# Patient Record
Sex: MALE | Race: WHITE | Employment: UNEMPLOYED | URBAN - METROPOLITAN AREA
[De-identification: names, ages, dates, MRNs, and addresses within clinical notes are randomized per-mention and may not be internally consistent; named-entity substitution may affect disease eponyms.]

---

## 2024-08-12 ENCOUNTER — APPOINTMENT (EMERGENCY)
Dept: RADIOLOGY | Facility: HOSPITAL | Age: 64
End: 2024-08-12

## 2024-08-12 ENCOUNTER — HOSPITAL ENCOUNTER (EMERGENCY)
Facility: HOSPITAL | Age: 64
Discharge: HOME | End: 2024-08-12
Attending: EMERGENCY MEDICINE

## 2024-08-12 VITALS
SYSTOLIC BLOOD PRESSURE: 147 MMHG | DIASTOLIC BLOOD PRESSURE: 74 MMHG | HEART RATE: 64 BPM | OXYGEN SATURATION: 97 % | WEIGHT: 138.89 LBS | RESPIRATION RATE: 17 BRPM | TEMPERATURE: 97.5 F

## 2024-08-12 DIAGNOSIS — R06.2 WHEEZING: ICD-10-CM

## 2024-08-12 DIAGNOSIS — J40 BRONCHITIS: Primary | ICD-10-CM

## 2024-08-12 LAB
ALBUMIN SERPL BCG-MCNC: 4.7 G/DL (ref 3.5–5)
ALP SERPL-CCNC: 45 U/L (ref 34–104)
ALT SERPL W P-5'-P-CCNC: 13 U/L (ref 7–52)
ANION GAP SERPL CALCULATED.3IONS-SCNC: 6 MMOL/L (ref 4–13)
AST SERPL W P-5'-P-CCNC: 14 U/L (ref 13–39)
ATRIAL RATE: 63 BPM
ATRIAL RATE: 67 BPM
BASOPHILS # BLD AUTO: 0.05 THOUSANDS/ÂΜL (ref 0–0.1)
BASOPHILS NFR BLD AUTO: 1 % (ref 0–1)
BILIRUB SERPL-MCNC: 0.7 MG/DL (ref 0.2–1)
BUN SERPL-MCNC: 15 MG/DL (ref 5–25)
CALCIUM SERPL-MCNC: 10 MG/DL (ref 8.4–10.2)
CARDIAC TROPONIN I PNL SERPL HS: <2 NG/L
CARDIAC TROPONIN I PNL SERPL HS: <2 NG/L
CHLORIDE SERPL-SCNC: 104 MMOL/L (ref 96–108)
CO2 SERPL-SCNC: 29 MMOL/L (ref 21–32)
CREAT SERPL-MCNC: 1.01 MG/DL (ref 0.6–1.3)
EOSINOPHIL # BLD AUTO: 0.51 THOUSAND/ÂΜL (ref 0–0.61)
EOSINOPHIL NFR BLD AUTO: 6 % (ref 0–6)
ERYTHROCYTE [DISTWIDTH] IN BLOOD BY AUTOMATED COUNT: 12 % (ref 11.6–15.1)
FLUAV RNA RESP QL NAA+PROBE: NEGATIVE
FLUBV RNA RESP QL NAA+PROBE: NEGATIVE
GFR SERPL CREATININE-BSD FRML MDRD: 79 ML/MIN/1.73SQ M
GLUCOSE SERPL-MCNC: 107 MG/DL (ref 65–140)
HCT VFR BLD AUTO: 45.4 % (ref 36.5–49.3)
HGB BLD-MCNC: 15.1 G/DL (ref 12–17)
IMM GRANULOCYTES # BLD AUTO: 0.02 THOUSAND/UL (ref 0–0.2)
IMM GRANULOCYTES NFR BLD AUTO: 0 % (ref 0–2)
LYMPHOCYTES # BLD AUTO: 2.13 THOUSANDS/ÂΜL (ref 0.6–4.47)
LYMPHOCYTES NFR BLD AUTO: 24 % (ref 14–44)
MCH RBC QN AUTO: 31.8 PG (ref 26.8–34.3)
MCHC RBC AUTO-ENTMCNC: 33.3 G/DL (ref 31.4–37.4)
MCV RBC AUTO: 96 FL (ref 82–98)
MONOCYTES # BLD AUTO: 0.51 THOUSAND/ÂΜL (ref 0.17–1.22)
MONOCYTES NFR BLD AUTO: 6 % (ref 4–12)
NEUTROPHILS # BLD AUTO: 5.59 THOUSANDS/ÂΜL (ref 1.85–7.62)
NEUTS SEG NFR BLD AUTO: 63 % (ref 43–75)
NRBC BLD AUTO-RTO: 0 /100 WBCS
P AXIS: 72 DEGREES
P AXIS: 82 DEGREES
PLATELET # BLD AUTO: 203 THOUSANDS/UL (ref 149–390)
PMV BLD AUTO: 9.9 FL (ref 8.9–12.7)
POTASSIUM SERPL-SCNC: 4.1 MMOL/L (ref 3.5–5.3)
PR INTERVAL: 132 MS
PR INTERVAL: 146 MS
PROT SERPL-MCNC: 7.6 G/DL (ref 6.4–8.4)
QRS AXIS: 52 DEGREES
QRS AXIS: 53 DEGREES
QRSD INTERVAL: 90 MS
QRSD INTERVAL: 96 MS
QT INTERVAL: 408 MS
QT INTERVAL: 430 MS
QTC INTERVAL: 431 MS
QTC INTERVAL: 440 MS
RBC # BLD AUTO: 4.75 MILLION/UL (ref 3.88–5.62)
RSV RNA RESP QL NAA+PROBE: NEGATIVE
SARS-COV-2 RNA RESP QL NAA+PROBE: NEGATIVE
SODIUM SERPL-SCNC: 139 MMOL/L (ref 135–147)
T WAVE AXIS: 53 DEGREES
T WAVE AXIS: 58 DEGREES
VENTRICULAR RATE: 63 BPM
VENTRICULAR RATE: 67 BPM
WBC # BLD AUTO: 8.81 THOUSAND/UL (ref 4.31–10.16)

## 2024-08-12 PROCEDURE — 80053 COMPREHEN METABOLIC PANEL: CPT

## 2024-08-12 PROCEDURE — 93005 ELECTROCARDIOGRAM TRACING: CPT

## 2024-08-12 PROCEDURE — 71046 X-RAY EXAM CHEST 2 VIEWS: CPT

## 2024-08-12 PROCEDURE — 94640 AIRWAY INHALATION TREATMENT: CPT

## 2024-08-12 PROCEDURE — 36415 COLL VENOUS BLD VENIPUNCTURE: CPT

## 2024-08-12 PROCEDURE — 84484 ASSAY OF TROPONIN QUANT: CPT

## 2024-08-12 PROCEDURE — 96374 THER/PROPH/DIAG INJ IV PUSH: CPT

## 2024-08-12 PROCEDURE — 99283 EMERGENCY DEPT VISIT LOW MDM: CPT

## 2024-08-12 PROCEDURE — 85025 COMPLETE CBC W/AUTO DIFF WBC: CPT

## 2024-08-12 PROCEDURE — 99285 EMERGENCY DEPT VISIT HI MDM: CPT

## 2024-08-12 PROCEDURE — 93010 ELECTROCARDIOGRAM REPORT: CPT | Performed by: INTERNAL MEDICINE

## 2024-08-12 PROCEDURE — 0241U HB NFCT DS VIR RESP RNA 4 TRGT: CPT

## 2024-08-12 RX ORDER — METHYLPREDNISOLONE SODIUM SUCCINATE 125 MG/2ML
125 INJECTION, POWDER, LYOPHILIZED, FOR SOLUTION INTRAMUSCULAR; INTRAVENOUS ONCE
Status: COMPLETED | OUTPATIENT
Start: 2024-08-12 | End: 2024-08-12

## 2024-08-12 RX ORDER — GUAIFENESIN 600 MG/1
1200 TABLET, EXTENDED RELEASE ORAL 2 TIMES DAILY
Qty: 28 TABLET | Refills: 0 | Status: SHIPPED | OUTPATIENT
Start: 2024-08-12

## 2024-08-12 RX ORDER — GUAIFENESIN 600 MG/1
600 TABLET, EXTENDED RELEASE ORAL ONCE
Status: COMPLETED | OUTPATIENT
Start: 2024-08-12 | End: 2024-08-12

## 2024-08-12 RX ORDER — BENZONATATE 100 MG/1
100 CAPSULE ORAL ONCE
Status: COMPLETED | OUTPATIENT
Start: 2024-08-12 | End: 2024-08-12

## 2024-08-12 RX ORDER — ALBUTEROL SULFATE 90 UG/1
2 AEROSOL, METERED RESPIRATORY (INHALATION) EVERY 6 HOURS PRN
Qty: 6.7 G | Refills: 0 | Status: SHIPPED | OUTPATIENT
Start: 2024-08-12 | End: 2024-08-12

## 2024-08-12 RX ORDER — BENZONATATE 100 MG/1
100 CAPSULE ORAL EVERY 8 HOURS
Qty: 21 CAPSULE | Refills: 0 | Status: SHIPPED | OUTPATIENT
Start: 2024-08-12

## 2024-08-12 RX ORDER — ALBUTEROL SULFATE 90 UG/1
2 AEROSOL, METERED RESPIRATORY (INHALATION) EVERY 6 HOURS PRN
Qty: 6.7 G | Refills: 0 | Status: SHIPPED | OUTPATIENT
Start: 2024-08-12 | End: 2024-08-26

## 2024-08-12 RX ORDER — IPRATROPIUM BROMIDE AND ALBUTEROL SULFATE 2.5; .5 MG/3ML; MG/3ML
3 SOLUTION RESPIRATORY (INHALATION) ONCE
Status: COMPLETED | OUTPATIENT
Start: 2024-08-12 | End: 2024-08-12

## 2024-08-12 RX ORDER — PREDNISONE 20 MG/1
40 TABLET ORAL DAILY
Qty: 8 TABLET | Refills: 0 | Status: SHIPPED | OUTPATIENT
Start: 2024-08-12 | End: 2024-08-16

## 2024-08-12 RX ORDER — BENZONATATE 100 MG/1
100 CAPSULE ORAL EVERY 8 HOURS
Qty: 21 CAPSULE | Refills: 0 | Status: SHIPPED | OUTPATIENT
Start: 2024-08-12 | End: 2024-08-12

## 2024-08-12 RX ORDER — GUAIFENESIN 600 MG/1
1200 TABLET, EXTENDED RELEASE ORAL 2 TIMES DAILY
Qty: 28 TABLET | Refills: 0 | Status: SHIPPED | OUTPATIENT
Start: 2024-08-12 | End: 2024-08-12

## 2024-08-12 RX ORDER — ALBUTEROL SULFATE 90 UG/1
2 AEROSOL, METERED RESPIRATORY (INHALATION) ONCE
Status: COMPLETED | OUTPATIENT
Start: 2024-08-12 | End: 2024-08-12

## 2024-08-12 RX ORDER — PREDNISONE 20 MG/1
40 TABLET ORAL DAILY
Qty: 8 TABLET | Refills: 0 | Status: SHIPPED | OUTPATIENT
Start: 2024-08-12 | End: 2024-08-12

## 2024-08-12 RX ADMIN — BENZONATATE 100 MG: 100 CAPSULE ORAL at 05:06

## 2024-08-12 RX ADMIN — ALBUTEROL SULFATE 2 PUFF: 90 AEROSOL, METERED RESPIRATORY (INHALATION) at 06:10

## 2024-08-12 RX ADMIN — METHYLPREDNISOLONE SODIUM SUCCINATE 125 MG: 125 INJECTION, POWDER, FOR SOLUTION INTRAMUSCULAR; INTRAVENOUS at 05:06

## 2024-08-12 RX ADMIN — IPRATROPIUM BROMIDE AND ALBUTEROL SULFATE 3 ML: 2.5; .5 SOLUTION RESPIRATORY (INHALATION) at 05:06

## 2024-08-12 RX ADMIN — GUAIFENESIN 600 MG: 600 TABLET, EXTENDED RELEASE ORAL at 06:10

## 2024-08-12 NOTE — ED PROVIDER NOTES
History  Chief Complaint   Patient presents with    Cough     Pt reports persistent productive cough, had a coughing attack tonight and was unable to catch breath.      The patient is a 63-year-old male with history of tobacco abuse who presents to the ED for evaluation of cough that has been present for several weeks.  Over the last day, he developed a pressure in his chest, as well as worsening coughing.  He reports he had several episodes of coughing fits where he could not catch his breath, prompting ED evaluation.  He otherwise denies fever, chills, nausea, vomiting, abdominal pain, back pain, sick contacts,  leg swelling, calf pain, recent travel, recent surgery, hemoptysis, history of DVT/PE.  He took NyQuil without relief.        None       History reviewed. No pertinent past medical history.    History reviewed. No pertinent surgical history.    History reviewed. No pertinent family history.  I have reviewed and agree with the history as documented.    E-Cigarette/Vaping     E-Cigarette/Vaping Substances     Social History     Tobacco Use    Smoking status: Every Day     Types: Cigarettes    Smokeless tobacco: Never   Substance Use Topics    Alcohol use: Yes       Review of Systems   Constitutional:  Negative for chills and fever.   Respiratory:  Positive for cough and shortness of breath (with coughing).    Cardiovascular:  Positive for chest pain. Negative for leg swelling.   Gastrointestinal:  Negative for abdominal pain, constipation, diarrhea, nausea and vomiting.   Genitourinary:  Negative for dysuria and flank pain.   Musculoskeletal:  Negative for arthralgias and myalgias.   Skin:  Negative for rash and wound.   Neurological:  Negative for dizziness, weakness, numbness and headaches.       Physical Exam  Physical Exam  Vitals and nursing note reviewed.   Constitutional:       General: He is not in acute distress.     Appearance: He is well-developed. He is not toxic-appearing.   HENT:      Head:  Normocephalic and atraumatic.   Eyes:      Conjunctiva/sclera: Conjunctivae normal.   Cardiovascular:      Rate and Rhythm: Normal rate and regular rhythm.      Heart sounds: No murmur heard.  Pulmonary:      Effort: Pulmonary effort is normal. No respiratory distress.      Breath sounds: Wheezing present.   Abdominal:      Palpations: Abdomen is soft.      Tenderness: There is no abdominal tenderness. There is no guarding or rebound.   Musculoskeletal:         General: No swelling.      Cervical back: Neck supple.      Right lower leg: No edema.      Left lower leg: No edema.   Skin:     General: Skin is warm and dry.      Capillary Refill: Capillary refill takes less than 2 seconds.   Neurological:      Mental Status: He is alert.   Psychiatric:         Mood and Affect: Mood normal.         Vital Signs  ED Triage Vitals   Temperature Pulse Respirations Blood Pressure SpO2   08/12/24 0446 08/12/24 0446 08/12/24 0446 08/12/24 0446 08/12/24 0446   97.5 °F (36.4 °C) 82 18 143/95 98 %      Temp Source Heart Rate Source Patient Position - Orthostatic VS BP Location FiO2 (%)   08/12/24 0446 08/12/24 0446 08/12/24 0446 08/12/24 0446 --   Oral Monitor Sitting Right arm       Pain Score       08/12/24 0637       No Pain           Vitals:    08/12/24 0446 08/12/24 0637 08/12/24 0708   BP: 143/95 160/79 147/74   Pulse: 82 76 64   Patient Position - Orthostatic VS: Sitting Lying Lying         Visual Acuity      ED Medications  Medications   methylPREDNISolone sodium succinate (Solu-MEDROL) injection 125 mg (125 mg Intravenous Given 8/12/24 0506)   ipratropium-albuterol (DUO-NEB) 0.5-2.5 mg/3 mL inhalation solution 3 mL (3 mL Nebulization Given 8/12/24 0506)   benzonatate (TESSALON PERLES) capsule 100 mg (100 mg Oral Given 8/12/24 0506)   albuterol (PROVENTIL HFA,VENTOLIN HFA) inhaler 2 puff (2 puffs Inhalation Given 8/12/24 0610)   guaiFENesin (MUCINEX) 12 hr tablet 600 mg (600 mg Oral Given 8/12/24 0610)       Diagnostic  Studies  Results Reviewed       Procedure Component Value Units Date/Time    HS Troponin I 2hr [[id number]] Collected: 08/12/24 0704    Lab Status: Final result Specimen: Blood from Arm, Left Updated: 08/12/24 0738     hs TnI 2hr <2 ng/L      Delta 2hr hsTnI --    FLU/RSV/COVID - if FLU/RSV clinically relevant [[id number]]  (Normal) Collected: 08/12/24 0506    Lab Status: Final result Specimen: Nares from Nose Updated: 08/12/24 0559     SARS-CoV-2 Negative     INFLUENZA A PCR Negative     INFLUENZA B PCR Negative     RSV PCR Negative    Narrative:      FOR PEDIATRIC PATIENTS - copy/paste COVID Guidelines URL to browser: [email or web address]    SARS-CoV-2 assay is a Nucleic Acid Amplification assay intended for the  qualitative detection of nucleic acid from SARS-CoV-2 in nasopharyngeal  swabs. Results are for the presumptive identification of SARS-CoV-2 RNA.    Positive results are indicative of infection with SARS-CoV-2, the virus  causing COVID-19, but do not rule out bacterial infection or co-infection  with other viruses. Laboratories within the [place] and its  territories are required to report all positive results to the appropriate  public health authorities. Negative results do not preclude SARS-CoV-2  infection and should not be used as the sole basis for treatment or other  patient management decisions. Negative results must be combined with  clinical observations, patient history, and epidemiological information.  This test has not been FDA cleared or approved.    This test has been authorized by FDA under an Emergency Use Authorization  (EUA). This test is only authorized for the duration of time the  declaration that circumstances exist justifying the authorization of the  emergency use of an in vitro diagnostic tests for detection of SARS-CoV-2  virus and/or diagnosis of COVID-19 infection under section 564(b)(1) of  the Act, 21 U.S.C.  360bbb-3(b)(1), unless the authorization is terminated  or revoked sooner. The test has been validated but independent review by FDA  and CLIA is pending.    Test performed using [facility] [employer]: This RT-PCR assay targets N2,  a region unique to SARS-CoV-2. A conserved region in the E-gene was chosen  for pan-Sarbecovirus detection which includes SARS-CoV-2.    According to CMS-2020-01-R, this platform meets the definition of high-throughput technology.    HS Troponin 0hr (reflex protocol) [[id number]]  (Normal) Collected: 08/12/24 0506    Lab Status: Final result Specimen: Blood from Arm, Left Updated: 08/12/24 0542     hs TnI 0hr <2 ng/L     Comprehensive metabolic panel [[id number]] Collected: 08/12/24 0506    Lab Status: Final result Specimen: Blood from Arm, Left Updated: 08/12/24 0538     Sodium 139 mmol/L      Potassium 4.1 mmol/L      Chloride 104 mmol/L      CO2 29 mmol/L      ANION GAP 6 mmol/L      BUN 15 mg/dL      Creatinine 1.01 mg/dL      Glucose 107 mg/dL      Calcium 10.0 mg/dL      AST 14 U/L      ALT 13 U/L      Alkaline Phosphatase 45 U/L      Total Protein 7.6 g/dL      Albumin 4.7 g/dL      Total Bilirubin 0.70 mg/dL      eGFR 79 ml/min/1.73sq m     Narrative:      National Kidney Disease Foundation guidelines for Chronic Kidney Disease (CKD):     Stage 1 with normal or high GFR (GFR > 90 mL/min/1.73 square meters)    Stage 2 Mild CKD (GFR = 60-89 mL/min/1.73 square meters)    Stage 3A Moderate CKD (GFR = 45-59 mL/min/1.73 square meters)    Stage 3B Moderate CKD (GFR = 30-44 mL/min/1.73 square meters)    Stage 4 Severe CKD (GFR = 15-29 mL/min/1.73 square meters)    Stage 5 End Stage CKD (GFR <15 mL/min/1.73 square meters)  Note: GFR calculation is accurate only with a steady state creatinine    CBC and differential [[id number]] Collected: 08/12/24 0506    Lab Status: Final result Specimen: Blood from Arm, Left Updated: 08/12/24 0515     WBC 8.81 Thousand/uL      RBC 4.75 Million/uL       Hemoglobin 15.1 g/dL      Hematocrit 45.4 %      MCV 96 fL      MCH 31.8 pg      MCHC 33.3 g/dL      RDW 12.0 %      MPV 9.9 fL      Platelets 203 Thousands/uL      nRBC 0 /100 WBCs      Segmented % 63 %      Immature Grans % 0 %      Lymphocytes % 24 %      Monocytes % 6 %      Eosinophils Relative 6 %      Basophils Relative 1 %      Absolute Neutrophils 5.59 Thousands/µL      Absolute Immature Grans 0.02 Thousand/uL      Absolute Lymphocytes 2.13 Thousands/µL      Absolute Monocytes 0.51 Thousand/µL      Eosinophils Absolute 0.51 Thousand/µL      Basophils Absolute 0.05 Thousands/µL                    XR chest 2 views   ED Interpretation by Trang Turcios PA-C (08/12 0553)   No evidence of infiltrate, pneumothorax, or acute cardiopulmonary disease as interpreted by me      Final Result by Ginny Morales MD (08/12 0602)      No acute cardiopulmonary disease.               Workstation performed: [id number]                    Procedures  Procedures         ED Course  ED Course as of 08/12/24 2334   Mon Aug 12, 2024   0518 WBC: 8.81   0518 EKG normal sinus rhythm at 67 bpm.  , QTc 431, normal axis, T wave inversion in V5, no ST elevation or depression as interpreted by me, no previous EKG for comparison   0543 hs TnI 0hr: <2  Patient's chest pain started 1-2 hours prior to arrival; will obtain repeat          Medical Decision Making  DDx including but not limited to:  URI, bronchitis, pneumonia, GERD, aspiration pneumonitis, viral illness, COVID 19, ACS, MI    Will obtain EKG, troponin to evaluate for ACS.  Will obtain chest x-ray to evaluate for cardiopulmonary abnormality including pneumonia, pneumothorax.  Will obtain CBC to evaluate for leukocytosis, anemia.  Will obtain CMP to evaluate kidney function, for electrolyte disturbance.   Will obtain COVID/flu/RSV testing.      Labs unremarkable. CXR without evidence of PNA, PNX. EKG without ischemic changes. Given CP started 1-2 hours ago, will  obtain delta. Care turned over to Willa Hernandez PA-C pending delta trop.          Problems Addressed:  Bronchitis: acute illness or injury  Wheezing: acute illness or injury    Amount and/or Complexity of Data Reviewed  Labs: ordered. Decision-making details documented in ED Course.  Radiology: ordered and independent interpretation performed. Decision-making details documented in ED Course.  ECG/medicine tests: ordered and independent interpretation performed. Decision-making details documented in ED Course.    Risk  OTC drugs.  Prescription drug management.                 Disposition  Final diagnoses:   Bronchitis   Wheezing     Time reflects when diagnosis was documented in both MDM as applicable and the Disposition within this note       Time User Action Codes Description Comment    8/12/2024  5:50 AM Trang Turcios [J40] Bronchitis     8/12/2024  5:51 AM Trang Turcios [R06.2] Wheezing           ED Disposition       ED Disposition   Discharge    Condition   Stable    Date/Time   Mon Aug 12, 2024  8:04 AM    Comment   Sesar Earl discharge to home/self care.                   Follow-up Information       Follow up With Specialties Details Why Contact Info Additional Information    [facility] Family Medicine Schedule an appointment as soon as possible for a visit   [address] [place] [address] [phone] [facility], [address] [place] [address], [address] [phone]            Discharge Medication List as of 8/12/2024  8:04 AM        CONTINUE these medications which have CHANGED    Details   albuterol (PROVENTIL HFA,VENTOLIN HFA) 90 mcg/act inhaler Inhale 2 puffs every 6 (six) hours as needed for wheezing or shortness of breath for up to 14 days, Starting Mon 8/12/2024, Until Mon 8/26/2024 at 2359, Normal      benzonatate (TESSALON PERLES) 100 mg capsule Take 1 capsule  (100 mg total) by mouth every 8 (eight) hours, Starting Mon 8/12/2024, Normal      guaiFENesin (MUCINEX) 600 mg 12 hr tablet Take 2 tablets (1,200 mg total) by mouth 2 (two) times a day, Starting Mon 8/12/2024, Normal      predniSONE 20 mg tablet Take 2 tablets (40 mg total) by mouth daily for 4 days, Starting Mon 8/12/2024, Until Fri 8/16/2024, Normal                 PDMP Review       None            ED Provider  Electronically Signed by             Trang Turcios PA-C  08/12/24 0735

## 2024-08-12 NOTE — ED CARE HANDOFF
Emergency Department Sign Out Note        Sign out and transfer of care from Trang Turcios PA-C. See Separate Emergency Department note.     The patient, Sesar Earl, was evaluated by the previous provider for cough for several weeks.    Per previous provider note - The patient is a 63-year-old male with history of tobacco abuse who presents to the ED for evaluation of cough that has been present for several weeks. Over the last day, he developed a pressure in his chest, as well as worsening coughing. He reports he had several episodes of coughing fits where he could not catch his breath, prompting ED evaluation. He otherwise denies fever, chills, nausea, vomiting, abdominal pain, back pain, sick contacts, leg swelling, calf pain, recent travel, recent surgery, hemoptysis, history of DVT/PE. He took NyQuil without relief.     Workup Completed:  EKG  CXR  CBC  CMP  Covid/flu/RSV  Troponin    ED Course / Workup Pending (followup):  0610 - Per sign out, waiting for delta troponin.    0745 - hs Tnl 2hr: <2    0800 - Informed patient of lab finding. Will discharge. Patient agreeable.    The management plan was discussed in detail with the patient at bedside and all questions were answered.  Prior to discharge, we provided both verbal and written instructions.  We discussed with the patient the signs and symptoms for which to return to the emergency department.  All questions were answered and patient was comfortable with the plan of care and discharged to home.  Instructed the patient to follow up with the primary care provider and/or specialist provided and their written instructions.  The patient verbalized understanding of our discussion and plan of care, and agrees to return to the Emergency Department for concerns and progression of illness.    At discharge, I instructed the patient to:  -follow up with pcp  -use Albuterol inhaler as prescribed  -take Tessalon, Guaifenesin and Prednisone as prescribed  -return  to the ER if symptoms worsened or new symptoms arose  Patient agreed to this plan and was stable at time of discharge.                                   ED Course as of 08/12/24 0832   Mon Aug 12, 2024   0745 hs TnI 2hr: <2     ECG 12 Lead Documentation Only    Date/Time: 8/12/2024 7:02 AM    Performed by: Willa Hernandez PA-C  Authorized by: Willa Hernandez PA-C    Indications / Diagnosis:  Delta  ECG reviewed by me, the ED Provider: yes (Also reviewed by attending)    Patient location:  ED  Interpretation:     Interpretation: abnormal    Rate:     ECG rate:  63    ECG rate assessment: normal    Rhythm:     Rhythm: sinus rhythm    Ectopy:     Ectopy: none    QRS:     QRS intervals:  Normal (96ms)  ST segments:     ST segments:  Normal  T waves:     T waves: inverted      Inverted:  AVR and V5    Medical Decision Making  Problems Addressed:  Bronchitis: acute illness or injury  Wheezing: acute illness or injury    Amount and/or Complexity of Data Reviewed  Independent Historian:      Details: Patient is historian  Labs: ordered. Decision-making details documented in ED Course.  Radiology: ordered and independent interpretation performed.  ECG/medicine tests: ordered and independent interpretation performed.    Risk  OTC drugs.  Prescription drug management.            Disposition  Final diagnoses:   Bronchitis   Wheezing     Time reflects when diagnosis was documented in both MDM as applicable and the Disposition within this note       Time User Action Codes Description Comment    8/12/2024  5:50 AM Trang Turcios Add [J40] Bronchitis     8/12/2024  5:51 AM Trang Turcios [R06.2] Wheezing           ED Disposition       ED Disposition   Discharge    Condition   Stable    Date/Time   Mon Aug 12, 2024  8:04 AM    Comment   Sesar Earl discharge to home/self care.                   Follow-up Information       Follow up With Specialties Details Why Contact Info Additional Information    [facility] Family Medicine Schedule an appointment as soon as possible for a visit   [address] [place] [address], [address] [address] [phone] [facility], [address] [place] [address], [place] [address], [place], [place], [address] [phone]          Discharge Medication List as of 8/12/2024  8:04 AM        CONTINUE these medications which have CHANGED    Details   albuterol (PROVENTIL HFA,VENTOLIN HFA) 90 mcg/act inhaler Inhale 2 puffs every 6 (six) hours as needed for wheezing or shortness of breath for up to 14 days, Starting Mon 8/12/2024, Until Mon 8/26/2024 at 2359, Normal      benzonatate (TESSALON PERLES) 100 mg capsule Take 1 capsule (100 mg total) by mouth every 8 (eight) hours, Starting Mon 8/12/2024, Normal      guaiFENesin (MUCINEX) 600 mg 12 hr tablet Take 2 tablets (1,200 mg total) by mouth 2 (two) times a day, Starting Mon 8/12/2024, Normal      predniSONE 20 mg tablet Take 2 tablets (40 mg total) by mouth daily for 4 days, Starting Mon 8/12/2024, Until Fri 8/16/2024, Normal                  ED Provider  Electronically Signed by     Willa Hernandez PA-C  08/12/24 1033

## 2024-08-12 NOTE — DISCHARGE INSTRUCTIONS
Use Albuterol inhaler as prescribed, as needed for coughing fit, shortness of breath, and or wheezing. If no improvement, return to the ER    Take Prednisone (steroid) for the next 4 days. This will decreased inflammation in your lungs    Take Tessalon and Mucinex as prescribed, as needed for cough. Do not combine with over the counter medications that contain guaifenesin (Mucinex)    Return for chest pain, trouble breathing, leg swelling, coughing up blood, passing out, or any other new/concerning symptoms     Follow up with Family Medicine      Weekly routine line dressing change completed. Patient's right SC VasCath dressing changed using sterile technique per protocol. Patient tolerated well. Please consult IV/PICC Team for any questions or if patient's needs change.

## 2025-05-11 ENCOUNTER — APPOINTMENT (INPATIENT)
Dept: NON INVASIVE DIAGNOSTICS | Facility: HOSPITAL | Age: 65
DRG: 174 | End: 2025-05-11
Payer: COMMERCIAL

## 2025-05-11 ENCOUNTER — HOSPITAL ENCOUNTER (INPATIENT)
Facility: HOSPITAL | Age: 65
LOS: 2 days | Discharge: HOME | DRG: 174 | End: 2025-05-13
Attending: EMERGENCY MEDICINE | Admitting: INTERNAL MEDICINE
Payer: COMMERCIAL

## 2025-05-11 ENCOUNTER — APPOINTMENT (EMERGENCY)
Dept: RADIOLOGY | Facility: HOSPITAL | Age: 65
DRG: 174 | End: 2025-05-11
Payer: COMMERCIAL

## 2025-05-11 DIAGNOSIS — I21.3 STEMI (ST ELEVATION MYOCARDIAL INFARCTION) (HCC): Primary | ICD-10-CM

## 2025-05-11 DIAGNOSIS — R00.1 BRADYCARDIA: ICD-10-CM

## 2025-05-11 PROBLEM — Z87.891 SMOKING HX: Status: ACTIVE | Noted: 2025-05-11

## 2025-05-11 PROBLEM — E78.5 HYPERLIPIDEMIA: Status: ACTIVE | Noted: 2025-05-11

## 2025-05-11 LAB
2HR DELTA HS TROPONIN: ABNORMAL NG/L
4HR DELTA HS TROPONIN: ABNORMAL NG/L
ANION GAP SERPL CALCULATED.3IONS-SCNC: 11 MMOL/L (ref 4–13)
AORTIC ROOT: 3.4 CM
APTT PPP: 24 SECONDS (ref 23–34)
ASCENDING AORTA: 3.5 CM
BASOPHILS # BLD AUTO: 0.09 THOUSANDS/ÂΜL (ref 0–0.1)
BASOPHILS NFR BLD AUTO: 1 % (ref 0–1)
BSA FOR ECHO PROCEDURE: 1.8 M2
BUN SERPL-MCNC: 16 MG/DL (ref 5–25)
CALCIUM SERPL-MCNC: 9.5 MG/DL (ref 8.4–10.2)
CARDIAC TROPONIN I PNL SERPL HS: 42 NG/L (ref ?–50)
CARDIAC TROPONIN I PNL SERPL HS: ABNORMAL NG/L (ref ?–50)
CARDIAC TROPONIN I PNL SERPL HS: ABNORMAL NG/L (ref ?–50)
CHLORIDE SERPL-SCNC: 102 MMOL/L (ref 96–108)
CHOLEST SERPL-MCNC: 202 MG/DL (ref ?–200)
CO2 SERPL-SCNC: 30 MMOL/L (ref 21–32)
CREAT SERPL-MCNC: 0.92 MG/DL (ref 0.6–1.3)
E WAVE DECELERATION TIME: 141 MS
E/A RATIO: 1.08
EOSINOPHIL # BLD AUTO: 0.8 THOUSAND/ÂΜL (ref 0–0.61)
EOSINOPHIL NFR BLD AUTO: 7 % (ref 0–6)
ERYTHROCYTE [DISTWIDTH] IN BLOOD BY AUTOMATED COUNT: 12.1 % (ref 11.6–15.1)
EST. AVERAGE GLUCOSE BLD GHB EST-MCNC: 120 MG/DL
FRACTIONAL SHORTENING: 29 (ref 28–44)
GFR SERPL CREATININE-BSD FRML MDRD: 88 ML/MIN/1.73SQ M
GLUCOSE SERPL-MCNC: 127 MG/DL (ref 65–140)
HBA1C MFR BLD: 5.8 %
HCT VFR BLD AUTO: 44.9 % (ref 36.5–49.3)
HDLC SERPL-MCNC: 49 MG/DL
HGB BLD-MCNC: 14.5 G/DL (ref 12–17)
IMM GRANULOCYTES # BLD AUTO: 0.03 THOUSAND/UL (ref 0–0.2)
IMM GRANULOCYTES NFR BLD AUTO: 0 % (ref 0–2)
INR PPP: 0.83 (ref 0.85–1.19)
INTERVENTRICULAR SEPTUM IN DIASTOLE (PARASTERNAL SHORT AXIS VIEW): 1.5 CM
INTERVENTRICULAR SEPTUM: 1.5 CM (ref 0.6–1.1)
LAAS-AP2: 17.8 CM2
LAAS-AP4: 20.4 CM2
LDLC SERPL CALC-MCNC: 128 MG/DL (ref 0–100)
LEFT ATRIUM SIZE: 3.9 CM
LEFT ATRIUM VOLUME (MOD BIPLANE): 56 ML
LEFT ATRIUM VOLUME INDEX (MOD BIPLANE): 31.1 ML/M2
LEFT INTERNAL DIMENSION IN SYSTOLE: 3.2 CM (ref 2.1–4)
LEFT VENTRICULAR INTERNAL DIMENSION IN DIASTOLE: 4.5 CM (ref 3.5–6)
LEFT VENTRICULAR POSTERIOR WALL IN END DIASTOLE: 1.2 CM
LEFT VENTRICULAR STROKE VOLUME: 52 ML
LV EF US.2D.A4C+ESTIMATED: 60 %
LVSV (TEICH): 52 ML
LYMPHOCYTES # BLD AUTO: 4.39 THOUSANDS/ÂΜL (ref 0.6–4.47)
LYMPHOCYTES NFR BLD AUTO: 39 % (ref 14–44)
MAGNESIUM SERPL-MCNC: 2.1 MG/DL (ref 1.9–2.7)
MCH RBC QN AUTO: 31.5 PG (ref 26.8–34.3)
MCHC RBC AUTO-ENTMCNC: 32.3 G/DL (ref 31.4–37.4)
MCV RBC AUTO: 97 FL (ref 82–98)
MONOCYTES # BLD AUTO: 0.79 THOUSAND/ÂΜL (ref 0.17–1.22)
MONOCYTES NFR BLD AUTO: 7 % (ref 4–12)
MV E'TISSUE VEL-LAT: 14 CM/S
MV E'TISSUE VEL-SEP: 9 CM/S
MV PEAK A VEL: 0.85 M/S
MV PEAK E VEL: 92 CM/S
MV STENOSIS PRESSURE HALF TIME: 41 MS
MV VALVE AREA P 1/2 METHOD: 5.4
NEUTROPHILS # BLD AUTO: 5.19 THOUSANDS/ÂΜL (ref 1.85–7.62)
NEUTS SEG NFR BLD AUTO: 46 % (ref 43–75)
NONHDLC SERPL-MCNC: 153 MG/DL
NRBC BLD AUTO-RTO: 0 /100 WBCS
PLATELET # BLD AUTO: 175 THOUSANDS/UL (ref 149–390)
PLATELET # BLD AUTO: 226 THOUSANDS/UL (ref 149–390)
PMV BLD AUTO: 10.3 FL (ref 8.9–12.7)
PMV BLD AUTO: 10.3 FL (ref 8.9–12.7)
POTASSIUM SERPL-SCNC: 3.8 MMOL/L (ref 3.5–5.3)
PROTHROMBIN TIME: 11.8 SECONDS (ref 12.3–15)
RBC # BLD AUTO: 4.61 MILLION/UL (ref 3.88–5.62)
RIGHT ATRIAL 2D VOLUME: 31 ML
RIGHT ATRIUM AREA SYSTOLE A4C: 13.1 CM2
RIGHT VENTRICLE ID DIMENSION: 3.5 CM
SL CV LEFT ATRIUM LENGTH A2C: 5 CM
SL CV LV EF: 55
SL CV PED ECHO LEFT VENTRICLE DIASTOLIC VOLUME (MOD BIPLANE) 2D: 94 ML
SL CV PED ECHO LEFT VENTRICLE SYSTOLIC VOLUME (MOD BIPLANE) 2D: 42 ML
SODIUM SERPL-SCNC: 143 MMOL/L (ref 135–147)
T4 FREE SERPL-MCNC: 0.99 NG/DL (ref 0.61–1.12)
TR MAX PG: 16 MMHG
TR PEAK VELOCITY: 2 M/S
TRICUSPID ANNULAR PLANE SYSTOLIC EXCURSION: 2.5 CM
TRICUSPID VALVE PEAK REGURGITATION VELOCITY: 1.98 M/S
TRIGL SERPL-MCNC: 123 MG/DL (ref ?–150)
TSH SERPL DL<=0.05 MIU/L-ACNC: 6.86 UIU/ML (ref 0.45–4.5)
WBC # BLD AUTO: 11.29 THOUSAND/UL (ref 4.31–10.16)

## 2025-05-11 PROCEDURE — 92978 ENDOLUMINL IVUS OCT C 1ST: CPT | Performed by: INTERNAL MEDICINE

## 2025-05-11 PROCEDURE — C1725 CATH, TRANSLUMIN NON-LASER: HCPCS | Performed by: INTERNAL MEDICINE

## 2025-05-11 PROCEDURE — 99285 EMERGENCY DEPT VISIT HI MDM: CPT

## 2025-05-11 PROCEDURE — 85347 COAGULATION TIME ACTIVATED: CPT

## 2025-05-11 PROCEDURE — 84484 ASSAY OF TROPONIN QUANT: CPT

## 2025-05-11 PROCEDURE — 71045 X-RAY EXAM CHEST 1 VIEW: CPT

## 2025-05-11 PROCEDURE — 96375 TX/PRO/DX INJ NEW DRUG ADDON: CPT

## 2025-05-11 PROCEDURE — 84439 ASSAY OF FREE THYROXINE: CPT | Performed by: STUDENT IN AN ORGANIZED HEALTH CARE EDUCATION/TRAINING PROGRAM

## 2025-05-11 PROCEDURE — 99285 EMERGENCY DEPT VISIT HI MDM: CPT | Performed by: EMERGENCY MEDICINE

## 2025-05-11 PROCEDURE — 93306 TTE W/DOPPLER COMPLETE: CPT | Performed by: INTERNAL MEDICINE

## 2025-05-11 PROCEDURE — C1769 GUIDE WIRE: HCPCS | Performed by: INTERNAL MEDICINE

## 2025-05-11 PROCEDURE — 85610 PROTHROMBIN TIME: CPT

## 2025-05-11 PROCEDURE — 84443 ASSAY THYROID STIM HORMONE: CPT | Performed by: STUDENT IN AN ORGANIZED HEALTH CARE EDUCATION/TRAINING PROGRAM

## 2025-05-11 PROCEDURE — 96374 THER/PROPH/DIAG INJ IV PUSH: CPT

## 2025-05-11 PROCEDURE — C1887 CATHETER, GUIDING: HCPCS | Performed by: INTERNAL MEDICINE

## 2025-05-11 PROCEDURE — 93306 TTE W/DOPPLER COMPLETE: CPT

## 2025-05-11 PROCEDURE — B2111ZZ FLUOROSCOPY OF MULTIPLE CORONARY ARTERIES USING LOW OSMOLAR CONTRAST: ICD-10-PCS | Performed by: INTERNAL MEDICINE

## 2025-05-11 PROCEDURE — C9606 PERC D-E COR REVASC W AMI S: HCPCS | Performed by: INTERNAL MEDICINE

## 2025-05-11 PROCEDURE — 93454 CORONARY ARTERY ANGIO S&I: CPT | Performed by: INTERNAL MEDICINE

## 2025-05-11 PROCEDURE — 99152 MOD SED SAME PHYS/QHP 5/>YRS: CPT | Performed by: INTERNAL MEDICINE

## 2025-05-11 PROCEDURE — C1874 STENT, COATED/COV W/DEL SYS: HCPCS | Performed by: INTERNAL MEDICINE

## 2025-05-11 PROCEDURE — 92941 PRQ TRLML REVSC TOT OCCL AMI: CPT | Performed by: INTERNAL MEDICINE

## 2025-05-11 PROCEDURE — 85025 COMPLETE CBC W/AUTO DIFF WBC: CPT

## 2025-05-11 PROCEDURE — 85049 AUTOMATED PLATELET COUNT: CPT | Performed by: STUDENT IN AN ORGANIZED HEALTH CARE EDUCATION/TRAINING PROGRAM

## 2025-05-11 PROCEDURE — 85730 THROMBOPLASTIN TIME PARTIAL: CPT

## 2025-05-11 PROCEDURE — 99153 MOD SED SAME PHYS/QHP EA: CPT | Performed by: INTERNAL MEDICINE

## 2025-05-11 PROCEDURE — 027034Z DILATION OF CORONARY ARTERY, ONE ARTERY WITH DRUG-ELUTING INTRALUMINAL DEVICE, PERCUTANEOUS APPROACH: ICD-10-PCS | Performed by: INTERNAL MEDICINE

## 2025-05-11 PROCEDURE — 83036 HEMOGLOBIN GLYCOSYLATED A1C: CPT | Performed by: STUDENT IN AN ORGANIZED HEALTH CARE EDUCATION/TRAINING PROGRAM

## 2025-05-11 PROCEDURE — C1753 CATH, INTRAVAS ULTRASOUND: HCPCS | Performed by: INTERNAL MEDICINE

## 2025-05-11 PROCEDURE — 36415 COLL VENOUS BLD VENIPUNCTURE: CPT

## 2025-05-11 PROCEDURE — 80061 LIPID PANEL: CPT

## 2025-05-11 PROCEDURE — 80048 BASIC METABOLIC PNL TOTAL CA: CPT

## 2025-05-11 PROCEDURE — 99223 1ST HOSP IP/OBS HIGH 75: CPT | Performed by: ANESTHESIOLOGY

## 2025-05-11 PROCEDURE — 93005 ELECTROCARDIOGRAM TRACING: CPT

## 2025-05-11 PROCEDURE — 83735 ASSAY OF MAGNESIUM: CPT

## 2025-05-11 DEVICE — STENT ONYXNG40026UX ONYX 4.00X26RX
Type: IMPLANTABLE DEVICE | Site: CORONARY | Status: FUNCTIONAL
Brand: ONYX FRONTIER™

## 2025-05-11 RX ORDER — ENOXAPARIN SODIUM 100 MG/ML
40 INJECTION SUBCUTANEOUS DAILY
Status: DISCONTINUED | OUTPATIENT
Start: 2025-05-11 | End: 2025-05-13 | Stop reason: HOSPADM

## 2025-05-11 RX ORDER — MIDAZOLAM HYDROCHLORIDE 2 MG/2ML
INJECTION, SOLUTION INTRAMUSCULAR; INTRAVENOUS CODE/TRAUMA/SEDATION MEDICATION
Status: DISCONTINUED | OUTPATIENT
Start: 2025-05-11 | End: 2025-05-11 | Stop reason: HOSPADM

## 2025-05-11 RX ORDER — NITROGLYCERIN 20 MG/100ML
INJECTION INTRAVENOUS CODE/TRAUMA/SEDATION MEDICATION
Status: DISCONTINUED | OUTPATIENT
Start: 2025-05-11 | End: 2025-05-11 | Stop reason: HOSPADM

## 2025-05-11 RX ORDER — CHLORHEXIDINE GLUCONATE ORAL RINSE 1.2 MG/ML
15 SOLUTION DENTAL EVERY 12 HOURS SCHEDULED
Status: DISCONTINUED | OUTPATIENT
Start: 2025-05-11 | End: 2025-05-13 | Stop reason: HOSPADM

## 2025-05-11 RX ORDER — ASPIRIN 81 MG/1
81 TABLET, CHEWABLE ORAL DAILY
Status: DISCONTINUED | OUTPATIENT
Start: 2025-05-12 | End: 2025-05-13 | Stop reason: HOSPADM

## 2025-05-11 RX ORDER — HEPARIN SODIUM 1000 [USP'U]/ML
4000 INJECTION, SOLUTION INTRAVENOUS; SUBCUTANEOUS ONCE
Status: COMPLETED | OUTPATIENT
Start: 2025-05-11 | End: 2025-05-11

## 2025-05-11 RX ORDER — HEPARIN SODIUM 1000 [USP'U]/ML
INJECTION, SOLUTION INTRAVENOUS; SUBCUTANEOUS CODE/TRAUMA/SEDATION MEDICATION
Status: DISCONTINUED | OUTPATIENT
Start: 2025-05-11 | End: 2025-05-11 | Stop reason: HOSPADM

## 2025-05-11 RX ORDER — SODIUM CHLORIDE 9 MG/ML
75 INJECTION, SOLUTION INTRAVENOUS CONTINUOUS
Status: DISCONTINUED | OUTPATIENT
Start: 2025-05-11 | End: 2025-05-11

## 2025-05-11 RX ORDER — LIDOCAINE HYDROCHLORIDE 10 MG/ML
INJECTION, SOLUTION EPIDURAL; INFILTRATION; INTRACAUDAL; PERINEURAL CODE/TRAUMA/SEDATION MEDICATION
Status: DISCONTINUED | OUTPATIENT
Start: 2025-05-11 | End: 2025-05-11 | Stop reason: HOSPADM

## 2025-05-11 RX ORDER — VERAPAMIL HCL 2.5 MG/ML
AMPUL (ML) INTRAVENOUS CODE/TRAUMA/SEDATION MEDICATION
Status: DISCONTINUED | OUTPATIENT
Start: 2025-05-11 | End: 2025-05-11 | Stop reason: HOSPADM

## 2025-05-11 RX ORDER — MAGNESIUM SULFATE HEPTAHYDRATE 40 MG/ML
2 INJECTION, SOLUTION INTRAVENOUS ONCE
Status: COMPLETED | OUTPATIENT
Start: 2025-05-11 | End: 2025-05-11

## 2025-05-11 RX ORDER — ATORVASTATIN CALCIUM 80 MG/1
80 TABLET, FILM COATED ORAL EVERY EVENING
Status: DISCONTINUED | OUTPATIENT
Start: 2025-05-11 | End: 2025-05-13 | Stop reason: HOSPADM

## 2025-05-11 RX ORDER — HYDROMORPHONE HCL/PF 1 MG/ML
0.5 SYRINGE (ML) INJECTION ONCE
Status: COMPLETED | OUTPATIENT
Start: 2025-05-11 | End: 2025-05-11

## 2025-05-11 RX ORDER — NICOTINE 21 MG/24HR
21 PATCH, TRANSDERMAL 24 HOURS TRANSDERMAL DAILY
Status: DISCONTINUED | OUTPATIENT
Start: 2025-05-11 | End: 2025-05-13 | Stop reason: HOSPADM

## 2025-05-11 RX ORDER — SODIUM CHLORIDE 9 MG/ML
3 INJECTION INTRAVENOUS
Status: DISCONTINUED | OUTPATIENT
Start: 2025-05-11 | End: 2025-05-11

## 2025-05-11 RX ORDER — FENTANYL CITRATE 50 UG/ML
INJECTION, SOLUTION INTRAMUSCULAR; INTRAVENOUS CODE/TRAUMA/SEDATION MEDICATION
Status: DISCONTINUED | OUTPATIENT
Start: 2025-05-11 | End: 2025-05-11 | Stop reason: HOSPADM

## 2025-05-11 RX ADMIN — HEPARIN SODIUM 4000 UNITS: 1000 INJECTION INTRAVENOUS; SUBCUTANEOUS at 03:05

## 2025-05-11 RX ADMIN — CHLORHEXIDINE GLUCONATE 15 ML: 1.2 SOLUTION ORAL at 20:18

## 2025-05-11 RX ADMIN — TICAGRELOR 90 MG: 90 TABLET ORAL at 20:18

## 2025-05-11 RX ADMIN — CHLORHEXIDINE GLUCONATE 15 ML: 1.2 SOLUTION ORAL at 08:15

## 2025-05-11 RX ADMIN — HYDROMORPHONE HYDROCHLORIDE 0.5 MG: 1 INJECTION, SOLUTION INTRAMUSCULAR; INTRAVENOUS; SUBCUTANEOUS at 03:02

## 2025-05-11 RX ADMIN — MORPHINE SULFATE 2 MG: 2 INJECTION, SOLUTION INTRAMUSCULAR; INTRAVENOUS at 10:35

## 2025-05-11 RX ADMIN — TICAGRELOR 180 MG: 90 TABLET ORAL at 02:57

## 2025-05-11 RX ADMIN — ENOXAPARIN SODIUM 40 MG: 40 INJECTION SUBCUTANEOUS at 08:15

## 2025-05-11 RX ADMIN — TICAGRELOR 90 MG: 90 TABLET ORAL at 08:15

## 2025-05-11 RX ADMIN — MAGNESIUM SULFATE HEPTAHYDRATE 2 G: 40 INJECTION, SOLUTION INTRAVENOUS at 14:17

## 2025-05-11 RX ADMIN — ATORVASTATIN CALCIUM 80 MG: 80 TABLET, FILM COATED ORAL at 18:21

## 2025-05-11 NOTE — ASSESSMENT & PLAN NOTE
EKG suggestive of inferior STEMI because of which patient was transferred to the Cath Lab for revascularization.   Initial troponin 0hr at 42, 2 hr and 4hr pending at this time.   -Underwent MUKESH to [facility]  -On ASA and brilinta  -High intensity statin  -Will hold off on beta blocker for now since heart rates around 60s, can consider initiating it later on  -Systolics around 120s at time of cardiac catheterization, will hold off on initiation of anti-hypertensive therapy at this time  -Echo ordered. Other cardiac risk stratification labs including TSH, A1c ordered.  -Continue telemetry monitoring

## 2025-05-11 NOTE — PHYSICAL THERAPY NOTE
Physical Therapy Cancellation Note             05/11/25 0735   Note Type   Note type Cancelled Session   Cancel Reasons Medical status     PT order received; chart reviewed; noted pt had urgent cardiac cath and stenting of his mid RCA earlier in AM; will hold PT assessment/mobilization at this time; will follow as clinical course allows.    Navarro Reyes

## 2025-05-11 NOTE — ASSESSMENT & PLAN NOTE
64 year old male presented to [facility] with intermittent chest pain for the last three days. He noted worsening and persistence of pain tonight and presented for evaluation. EKG on admission revealed inferior STEMI and he was take emergently to the cath lab. Patient arrives to the ICU post procedure with no complaints.   5/11 [facility]: MUKESH to mRCA  Plan:  Check EKG Now  Obtain Echo  ASA, Brilinta  Statin  Consider B- Blocker with Hemodynamic Trend  Follow- up A1C and Lipid Panel   PRN Nitro for Chest Pain

## 2025-05-11 NOTE — ED PROVIDER NOTES
Time reflects when diagnosis was documented in both MDM as applicable and the Disposition within this note       Time User Action Codes Description Comment    5/11/2025  2:51 AM Uvaldo Wahl Add [I21.3] STEMI (ST elevation myocardial infarction) ([facility])           ED Disposition       ED Disposition   Admit    Condition   Stable    Date/Time   Sun May 11, 2025  4:26 AM    Comment   Case was discussed with Dr. Sandra and the patient's admission status was agreed to be Admission Status: inpatient status to the service of Dr. Sandra .               Assessment & Plan       Medical Decision Making  Patient is a 64 y.o. male presenting with chest pain.    Vital signs: Stable    Pertinent physical exam: Lungs clear to auscultation bilaterally, cardiac regular rate and rhythm, no murmurs rubs or gallops.  Intact distal pulses.    Differential diagnosis and plan:   STEMI  CBC to evaluate for signs of infection, anemia.  BMP to evaluate for electrolyte abnormalities, renal function  Additional STEMI labs including lipid panel, PT/INR, APTT, troponin, magnesium.  Chest x-ray to evaluate for widened mediastinum prior to initiating heparin bolus.  Brilinta  Patient already received aspirin prehospital.    View ED course above for further discussion on patient workup.     Review of Previous Medical Records: Reviewed    All labs reviewed and utilized in the medical decision making process  All radiology studies independently viewed by me and interpreted by the radiologist.  I reviewed all testing with the patient.     ED course:  Patient prehospital MI alert based off of prehospital EKG showing inferior lateral ST segment elevations.  MI alert called prior to patient arrival.  Upon patient arrival in emergency department, immediately evaluated by emergency medicine physician.  EKG obtained immediately.  IV access obtained.  Patient loaded with Brilinta  Chest x-ray showing no widened mediastinum  Patient loaded with  "heparin.  Patient sent to Cath Lab.      Reevaluation: Stable, sent to Cath Lab    Disposition: Admitted, sent to Cath Lab    Portions of the record may have been created with voice recognition software. Occasional wrong word or \"sound a like\" substitutions may have occurred due to the inherent limitations of voice recognition software. Read the chart carefully and recognize, using context, where substitutions have occurred.      Amount and/or Complexity of Data Reviewed  Labs: ordered. Decision-making details documented in ED Course.  Radiology: ordered and independent interpretation performed.  ECG/medicine tests:  Decision-making details documented in ED Course.    Risk  Prescription drug management.  Decision regarding hospitalization.        ED Course as of 05/11/25 0654   Sun May 11, 2025   0310 ECG 12 lead  EKG showing sinus rhythm at 61 bpm.  Normal axis.  Normal intervals.  Significant ST elevations in the inferior and lateral leads, signifying acute inferior lateral infarct.    When compared to previous EKG, ST elevations are new   0316 WBC(!): 11.29       Medications   heparin (porcine) injection 4,000 Units (4,000 Units Intravenous Given 5/11/25 0305)   ticagrelor (BRILINTA) tablet 180 mg (180 mg Oral Given 5/11/25 0257)   HYDROmorphone (DILAUDID) injection 0.5 mg (0.5 mg Intravenous Given 5/11/25 0302)       ED Risk Strat Scores   HEART Risk Score      Flowsheet Row Most Recent Value   Heart Score Risk Calculator    History 2 Filed at: 05/11/2025 0259   ECG 2 Filed at: 05/11/2025 0259   Age 1 Filed at: 05/11/2025 0259   Risk Factors 1 Filed at: 05/11/2025 0259   Troponin 2 Filed at: 05/11/2025 0259   HEART Score 8 Filed at: 05/11/2025 0259          HEART Risk Score      Flowsheet Row Most Recent Value   Heart Score Risk Calculator    History 2 Filed at: 05/11/2025 0259   ECG 2 Filed at: 05/11/2025 0259   Age 1 Filed at: 05/11/2025 0259   Risk Factors 1 Filed at: 05/11/2025 0259   Troponin 2 Filed at: " 05/11/2025 0259   HEART Score 8 Filed at: 05/11/2025 0259                      No data recorded                            History of Present Illness       Chief Complaint   Patient presents with    Chest Pain     Per ems - patient has had CP for 3-4 days, progressively gotten worse this morning       History reviewed. No pertinent past medical history.   History reviewed. No pertinent surgical history.   History reviewed. No pertinent family history.   Social History     Tobacco Use    Smoking status: Every Day     Types: Cigarettes    Smokeless tobacco: Never   Substance Use Topics    Alcohol use: Yes      E-Cigarette/Vaping      E-Cigarette/Vaping Substances      I have reviewed and agree with the history as documented.     64-year-old male with no prior medical history presenting to emergency department for intermittent chest pain for the past several days.  Tonight developed crushing midsternal chest pain with radiation to the left arm.  Prehospital EKG shows inferior lateral STEMI.  Aspirin given prior to arrival.  Denies shortness of breath, nausea, vomiting, abdominal pain, lightheadedness.          Review of Systems   Constitutional:  Negative for diaphoresis and fever.   Respiratory:  Negative for cough, shortness of breath and wheezing.    Cardiovascular:  Positive for chest pain. Negative for palpitations and leg swelling.   Gastrointestinal:  Negative for abdominal pain, diarrhea, nausea and vomiting.   Genitourinary:  Negative for decreased urine volume, difficulty urinating and frequency.   Skin:  Negative for pallor and wound.   Neurological:  Negative for dizziness and headaches.           Objective       ED Triage Vitals   Temp Pulse Blood Pressure Respirations SpO2 Patient Position - Orthostatic VS   -- 05/11/25 0258 05/11/25 0258 05/11/25 0258 05/11/25 0258 --    62 129/93 20 99 %       Temp src Heart Rate Source BP Location FiO2 (%) Pain Score    -- 05/11/25 0258 -- -- 05/11/25 0300     Monitor    9      Vitals      Date and Time Temp Pulse SpO2 Resp BP Pain Score FACES Pain Rating User   05/11/25 0630 -- 63 -- 16 108/66 -- --    05/11/25 0615 -- 59 97 % 17 118/66 -- -- LW   05/11/25 0600 -- 62 96 % 18 105/65 -- --    05/11/25 0545 -- 58 95 % 18 104/65 -- --    05/11/25 0515 -- 66 96 % 23 115/69 -- --    05/11/25 0500 -- 68 97 % 18 124/73 -- --    05/11/25 0445 -- 70 98 % 21 117/70 -- --    05/11/25 0416 -- -- -- -- -- 4 Chest -- TF   05/11/25 0335 -- -- -- -- -- 5 Chest -- TF   05/11/25 0333 -- -- 100 % -- -- -- -- TF   05/11/25 0302 -- -- -- -- -- 9 -- KG   05/11/25 0300 -- -- -- -- -- 9 -- KG   05/11/25 0258 -- 62 99 % 20 129/93 -- -- KG            Physical Exam  Vitals and nursing note reviewed.   Constitutional:       General: He is not in acute distress.     Appearance: Normal appearance. He is not ill-appearing or diaphoretic.   HENT:      Head: Normocephalic and atraumatic.   Neck:      Vascular: No JVD.   Cardiovascular:      Rate and Rhythm: Normal rate and regular rhythm.      Pulses:           Radial pulses are 2+ on the right side and 2+ on the left side.        Posterior tibial pulses are 2+ on the right side and 2+ on the left side.      Heart sounds: No murmur heard.     No friction rub.   Pulmonary:      Effort: No tachypnea, bradypnea, accessory muscle usage, respiratory distress or retractions.      Breath sounds: No stridor. No decreased breath sounds, wheezing, rhonchi or rales.   Abdominal:      General: There is no distension.      Palpations: Abdomen is soft. There is no pulsatile mass.      Tenderness: There is no abdominal tenderness.   Musculoskeletal:      Right lower leg: No edema.      Left lower leg: No edema.      Comments: No calf tenderness, erythema, edema bilaterally   Skin:     General: Skin is warm and dry.      Capillary Refill: Capillary refill takes less than 2 seconds.   Neurological:      Mental Status: He is alert.   Psychiatric:         Mood and  "Affect: Mood and affect normal.         Results Reviewed       Procedure Component Value Units Date/Time    T4, free [[id number]]  (Normal) Collected: 05/11/25 0306    Lab Status: Final result Specimen: Blood from Arm, Right Updated: 05/11/25 0556     Free T4 0.99 ng/dL     Narrative:        \"Therapeutic range for patients medicated with thyroid disorders: 0.61-1.24 ng/dL.\"    TSH, 3rd generation with Free T4 reflex [[id number]]  (Abnormal) Collected: 05/11/25 0306    Lab Status: Final result Specimen: Blood from Arm, Right Updated: 05/11/25 0427     TSH 3RD GENERATON 6.859 uIU/mL     Hemoglobin A1C [[id number]]  (Abnormal) Collected: 05/11/25 0306    Lab Status: Final result Specimen: Blood from Arm, Right Updated: 05/11/25 0407     Hemoglobin A1C 5.8 %       mg/dl     Basic metabolic panel [[id number]] Collected: 05/11/25 0306    Lab Status: Final result Specimen: Blood from Arm, Right Updated: 05/11/25 0338     Sodium 143 mmol/L      Potassium 3.8 mmol/L      Chloride 102 mmol/L      CO2 30 mmol/L      ANION GAP 11 mmol/L      BUN 16 mg/dL      Creatinine 0.92 mg/dL      Glucose 127 mg/dL      Calcium 9.5 mg/dL      eGFR 88 ml/min/1.73sq m     Narrative:      National Kidney Disease Foundation guidelines for Chronic Kidney Disease (CKD):     Stage 1 with normal or high GFR (GFR > 90 mL/min/1.73 square meters)    Stage 2 Mild CKD (GFR = 60-89 mL/min/1.73 square meters)    Stage 3A Moderate CKD (GFR = 45-59 mL/min/1.73 square meters)    Stage 3B Moderate CKD (GFR = 30-44 mL/min/1.73 square meters)    Stage 4 Severe CKD (GFR = 15-29 mL/min/1.73 square meters)    Stage 5 End Stage CKD (GFR <15 mL/min/1.73 square meters)  Note: GFR calculation is accurate only with a steady state creatinine    Lipid panel [[id number]]  (Abnormal) Collected: 05/11/25 0306    Lab Status: Final result Specimen: Blood from Arm, Right Updated: 05/11/25 0338     Cholesterol 202 mg/dL      Triglycerides 123 mg/dL      HDL, Direct 49 mg/dL  "     LDL Calculated 128 mg/dL      Non-HDL-Chol (CHOL-HDL) 153 mg/dl     Magnesium [[id number]]  (Normal) Collected: 05/11/25 0306    Lab Status: Final result Specimen: Blood from Arm, Right Updated: 05/11/25 0338     Magnesium 2.1 mg/dL     HS Troponin 0hr (reflex protocol) [[id number]]  (Normal) Collected: 05/11/25 0306    Lab Status: Final result Specimen: Blood from Arm, Right Updated: 05/11/25 0335     hs TnI 0hr 42 ng/L     Protime-INR [[id number]]  (Abnormal) Collected: 05/11/25 0306    Lab Status: Final result Specimen: Blood from Arm, Right Updated: 05/11/25 0329     Protime 11.8 seconds      INR 0.83    Narrative:      INR Therapeutic Range    Indication                                             INR Range      Atrial Fibrillation                                               2.0-3.0  Hypercoagulable State                                    2.0.2.3  Left Ventricular Asist Device                            2.0-3.0  Mechanical Heart Valve                                  -    Aortic(with afib, MI, embolism, HF, LA enlargement,    and/or coagulopathy)                                     2.0-3.0 (2.5-3.5)     Mitral                                                             2.5-3.5  Prosthetic/Bioprosthetic Heart Valve               2.0-3.0  Venous thromboembolism (VTE: VT, PE        2.0-3.0    APTT [[id number]]  (Normal) Collected: 05/11/25 0306    Lab Status: Final result Specimen: Blood from Arm, Right Updated: 05/11/25 0329     PTT 24 seconds     CBC and differential [[id number]]  (Abnormal) Collected: 05/11/25 0306    Lab Status: Final result Specimen: Blood from Arm, Right Updated: 05/11/25 0314     WBC 11.29 Thousand/uL      RBC 4.61 Million/uL      Hemoglobin 14.5 g/dL      Hematocrit 44.9 %      MCV 97 fL      MCH 31.5 pg      MCHC 32.3 g/dL      RDW 12.1 %      MPV 10.3 fL      Platelets 226 Thousands/uL      nRBC 0 /100 WBCs      Segmented % 46 %      Immature Grans % 0 %      Lymphocytes % 39 %       Monocytes % 7 %      Eosinophils Relative 7 %      Basophils Relative 1 %      Absolute Neutrophils 5.19 Thousands/µL      Absolute Immature Grans 0.03 Thousand/uL      Absolute Lymphocytes 4.39 Thousands/µL      Absolute Monocytes 0.79 Thousand/µL      Eosinophils Absolute 0.80 Thousand/µL      Basophils Absolute 0.09 Thousands/µL             XR chest 1 view portable   ED Interpretation by Uvaldo Wahl DO (05/11 0317)   No infiltrate, no pneumothorax, no effusion.  No widened mediastinum.          Procedures    ED Medication and Procedure Management   Prior to Admission Medications   Prescriptions Last Dose Informant Patient Reported? Taking?   benzonatate (TESSALON PERLES) 100 mg capsule   No No   Sig: Take 1 capsule (100 mg total) by mouth every 8 (eight) hours   guaiFENesin (MUCINEX) 600 mg 12 hr tablet   No No   Sig: Take 2 tablets (1,200 mg total) by mouth 2 (two) times a day      Facility-Administered Medications: None     Current Discharge Medication List        CONTINUE these medications which have NOT CHANGED    Details   benzonatate (TESSALON PERLES) 100 mg capsule Take 1 capsule (100 mg total) by mouth every 8 (eight) hours  Qty: 21 capsule, Refills: 0    Associated Diagnoses: Bronchitis; Wheezing      guaiFENesin (MUCINEX) 600 mg 12 hr tablet Take 2 tablets (1,200 mg total) by mouth 2 (two) times a day  Qty: 28 tablet, Refills: 0    Associated Diagnoses: Bronchitis; Wheezing           No discharge procedures on file.  ED SEPSIS DOCUMENTATION   Time reflects when diagnosis was documented in both MDM as applicable and the Disposition within this note       Time User Action Codes Description Comment    5/11/2025  2:51 AM Uvaldo Wahl Add [I21.3] STEMI (ST elevation myocardial infarction) ([facility])                  Uvaldo Wahl DO  05/11/25 0654

## 2025-05-11 NOTE — CASE MANAGEMENT
Case Management Assessment & Discharge Planning Note    Patient name Sesar Earl  [id number]  : 1960 Date 2025       Current Admission Date: 2025  Current Admission Diagnosis:STEMI (ST elevation myocardial infarction) (HCC)   Patient Active Problem List    Diagnosis Date Noted Date Diagnosed    STEMI (ST elevation myocardial infarction) (HCC) 2025     Hyperlipidemia 2025     Smoking hx 2025       LOS (days): 0  Geometric Mean LOS (GMLOS) (days):   Days to GMLOS:     OBJECTIVE:    Risk of Unplanned Readmission Score: 7.66         Current admission status: Inpatient       Preferred Pharmacy:   [id number] - [place] - [address] [place] [address]  [address]  [address] [place] [address]  [address]  [facility] [address]  Phone: [phone] Fax: [phone]    [id number] - [place] - [address]  [address]   [place] [address]  Phone: [phone] Fax: [phone]    Primary Care Provider: No primary care provider on file.    Primary Insurance:   Secondary Insurance:     ASSESSMENT:  Active Health Care Proxies    There are no active Health Care Proxies on file.       Patient Information  Admitted from:: Home  Mental Status: Alert  During Assessment patient was accompanied by: Not accompanied during assessment  Assessment information provided by:: Patient  Primary Caregiver: Self  Support Systems: Self, Son  County of Residence: [place]  What [facility] do you live in?: [place]  Home entry access options. Select all that apply.: Stairs  Number of steps to enter home.: 2  Type of Current Residence: 2 story home  Upon entering residence, is there a bedroom on the main floor (no further steps)?: No  A bedroom is located on the following floor levels of residence (select all that apply):: 2nd Floor  Upon entering residence, is there a bathroom on the main floor (no further steps)?: No  Indicate which floors of current residence have a  bathroom (select all the apply):: 2nd Floor  Number of steps to 2nd floor from main floor: One Flight  Living Arrangements: Lives Alone  Is patient a ?: No    Activities of Daily Living Prior to Admission  Functional Status: Independent  Completes ADLs independently?: Yes  Ambulates independently?: Yes  Does patient use assisted devices?: No  Does patient currently own DME?: No  Does patient have a history of Outpatient Therapy (PT/OT)?: Yes  Does the patient have a history of Short-Term Rehab?: No  Does patient have a history of HHC?: No  Does patient currently have HHC?: No    Patient Information Continued  Income Source: Unknown  Does patient have prescription coverage?: No  Can the patient afford their medications and any related supplies (such as glucometers or test strips)?: N/A  Does patient receive dialysis treatments?: No  Does patient have a history of substance abuse?: No  Does patient have a history of Mental Health Diagnosis?: No    Means of Transportation  Means of Transport to Appts:: Drives Self      DISCHARGE DETAILS:    Discharge planning discussed with:: Patient  Freedom of Choice: Yes  Comments - Freedom of Choice: Discussed FOC  CM contacted family/caregiver?: No- see comments (declined)       Other Referral/Resources/Interventions Provided:  Referral Comments: This CM introduced self and role to patient at bedside, lives alone (son is in and out) in 2 story home, 2 GEOR[address], has bedroom and bathroom located on 2nd floor (FFOS).  Independent with ADLS, no DME at home, typically does not drive.  No history of OP, STR or HH.  Patient reports no medical coverage, email sent to hospital financial counselors requesting referral to PATHS

## 2025-05-11 NOTE — ASSESSMENT & PLAN NOTE
Started on high intensity statin.  -Lipid panel from this admission showing , triglycerides 123, HDL 49,

## 2025-05-11 NOTE — ED ATTENDING ATTESTATION
5/11/2025   IMarj MD, saw and evaluated the patient. I have discussed the patient with the resident/non-physician practitioner and agree with the resident's/non-physician practitioner's findings, Plan of Care, and MDM as documented in the resident's/non-physician practitioner's note, except where noted. All available labs and Radiology studies were reviewed.  I was present for key portions of any procedure(s) performed by the resident/non-physician practitioner and I was immediately available to provide assistance.       At this point I agree with the current assessment done in the Emergency Department.  I have conducted an independent evaluation of this patient a history and physical is as follows:    [facility] [id number]    Chief Complaint   Patient presents with    Chest Pain     Per ems - patient has had CP for 3-4 days, progressively gotten worse this morning     64-year-old male with history of tobacco use presenting with chest pain.  We received prehospital call from EMS with EKG that indicated inferior STEMI.  Patient has had intermittent chest pains over the past 3 days, however, 45 minutes prior to arrival, patient developed persistent chest pain, central, substernal, with radiation to left shoulder.  No back pain.  No nausea or vomiting.  Has not had similar symptoms previously.  No underlying cardiac problems, although patient does admit that he does not follow-up with a doctor.    Physical Exam  ED Triage Vitals   Temp Pulse Respirations Blood Pressure SpO2   -- 05/11/25 0258 05/11/25 0258 05/11/25 0258 05/11/25 0258    62 20 129/93 99 %      Temp src Heart Rate Source Patient Position - Orthostatic VS BP Location FiO2 (%)   -- 05/11/25 0258 -- -- --    Monitor         Pain Score       05/11/25 0300       9           Vital signs and nursing notes reviewed    CONSTITUTIONAL: male appearing stated age resting in bed, in no acute distress  HEENT: atraumatic,  normocephalic. Sclera anicteric, conjunctiva are not injected. Moist oral mucosa  CARDIOVASCULAR/CHEST: RRR, no M/R/G. 2+ radial pulses  PULMONARY: Breathing comfortably on RA. Breath sounds are equal and clear to auscultation  ABDOMEN: non-distended. BS present, normoactive. Non-tender  MSK: moves all extremities, no deformities, no peripheral edema, no calf asymmetry  NEURO: Awake, alert, and oriented x 3. Face symmetric. Moves all extremities spontaneously. No focal neurologic deficits  SKIN: Warm, appears well-perfused  MENTAL STATUS: Normal affect      Labs and Imaging  Labs Reviewed   CBC AND DIFFERENTIAL - Abnormal       Result Value Ref Range Status    WBC 11.29 (*) 4.31 - 10.16 Thousand/uL Final    RBC 4.61  3.88 - 5.62 Million/uL Final    Hemoglobin 14.5  12.0 - 17.0 g/dL Final    Hematocrit 44.9  36.5 - 49.3 % Final    MCV 97  82 - 98 fL Final    MCH 31.5  26.8 - 34.3 pg Final    MCHC 32.3  31.4 - 37.4 g/dL Final    RDW 12.1  11.6 - 15.1 % Final    MPV 10.3  8.9 - 12.7 fL Final    Platelets 226  149 - 390 Thousands/uL Final    nRBC 0  /100 WBCs Final    Segmented % 46  43 - 75 % Final    Immature Grans % 0  0 - 2 % Final    Lymphocytes % 39  14 - 44 % Final    Monocytes % 7  4 - 12 % Final    Eosinophils Relative 7 (*) 0 - 6 % Final    Basophils Relative 1  0 - 1 % Final    Absolute Neutrophils 5.19  1.85 - 7.62 Thousands/µL Final    Absolute Immature Grans 0.03  0.00 - 0.20 Thousand/uL Final    Absolute Lymphocytes 4.39  0.60 - 4.47 Thousands/µL Final    Absolute Monocytes 0.79  0.17 - 1.22 Thousand/µL Final    Eosinophils Absolute 0.80 (*) 0.00 - 0.61 Thousand/µL Final    Basophils Absolute 0.09  0.00 - 0.10 Thousands/µL Final       XR chest 1 view portable   ED Interpretation   No infiltrate, no pneumothorax, no effusion.  No widened mediastinum.            Procedures  ECG 12 Lead Documentation Only    Date/Time: 5/11/2025 2:58 AM    Performed by: Marj Ravi MD  Authorized by: Marj Ravi MD     Comments:      Normal sinus rhythm, ventricular rate 61, NY interval 186, QRS 88, QTc 410, normal axis, there are ST elevations in inferior leads as well as lateral leads concerning for STEMI with reciprocal changes.  Patient is having 7 out of 10 chest pain.  Patient is going to Cath Lab with Dr. Sandra.    HEART Risk Score      Flowsheet Row Most Recent Value   Heart Score Risk Calculator    History 2 Filed at: 05/11/2025 0259   ECG 2 Filed at: 05/11/2025 0259   Age 1 Filed at: 05/11/2025 0259   Risk Factors 1 Filed at: 05/11/2025 0259   Troponin 2 Filed at: 05/11/2025 0259   HEART Score 8 Filed at: 05/11/2025 0259                ED Course  Medications   sodium chloride (PF) 0.9 % injection 3 mL ( Intravenous MAR Hold 5/11/25 0323)   sodium chloride 0.9 % infusion (has no administration in time range)   atorvastatin (LIPITOR) tablet 80 mg (has no administration in time range)   aspirin chewable tablet 81 mg (has no administration in time range)   enoxaparin (LOVENOX) subcutaneous injection 40 mg (has no administration in time range)   ticagrelor (BRILINTA) tablet 90 mg (has no administration in time range)   heparin (porcine) injection 4,000 Units (4,000 Units Intravenous Given 5/11/25 0305)   ticagrelor (BRILINTA) tablet 180 mg (180 mg Oral Given 5/11/25 0257)   HYDROmorphone (DILAUDID) injection 0.5 mg (0.5 mg Intravenous Given 5/11/25 0302)     64-year-old male presenting with chest pain for the past 45 minutes, prehospital EKG concerning for STEMI, although EKG here is concerning for STEMI, patient is a STEMI alert.  Chest x-ray to my review is without cardiomediastinal widening, without infiltrates, without pulmonary edema.  Dilaudid administered for pain.  Patient loaded with ticagrelor and taken to Cath Lab with Dr. Sandra.  Patient will be admitted to cardiology service after Cath Lab.

## 2025-05-11 NOTE — OCCUPATIONAL THERAPY NOTE
Occupational Therapy Cancel Note      Patient Name: Sesar Earl  Today's Date: 5/11/2025 05/11/25 0745   OT Last Visit   OT Visit Date 05/11/25   Note Type   Note type Cancelled Session   Cancel Reasons Medical status   Additional Comments OT consult received, chart reviewed. Pt admitted with STEMI and urgent cardiac cath earlier this AM. OT will hold and continue to follow as medically appropriate.         Cinda Cardenas, ASHLY, OTR/L

## 2025-05-11 NOTE — H&P
H&P - Critical Care/ICU   Name: Sesar Earl 64 y.o. male I MRN: [id number]  Unit/Bed#: PPHP-310-01 I Date of Admission: 5/11/2025   Date of Service: 5/11/2025 I Hospital Day: 0       Assessment & Plan  STEMI (ST elevation myocardial infarction) (HCC)  64 year old male presented to [facility] with intermittent chest pain for the last three days. He noted worsening and persistence of pain tonight and presented for evaluation. EKG on admission revealed inferior STEMI and he was take emergently to the cath lab. Patient arrives to the ICU post procedure with no complaints.   5/11 LHC: MUKESH to mRCA  Plan:  Check EKG Now  Obtain Echo  ASA, Brilinta  Statin  Consider B- Blocker with Hemodynamic Trend  Follow- up A1C and Lipid Panel   PRN Nitro for Chest Pain   Smoking hx  Hx of Tobacco Abuse  Plan:  Encourage cessation.  Offer Nicotine Patch.   Disposition: Stepdown Level 1    History of Present Illness   Sesar Earl is a 64 y.o. who presents to [facility] 5/11 with Chest Pain. Inferior GEORGE in the emergency department and underwent emergent LHC with MUKESH to mRCA. Patient arrives to the ICU without complaint.     History obtained from chart review and the patient.  Review of Systems: See HPI for Review of Systems    Historical Information   No past medical history on file. No past surgical history on file.   Current Outpatient Medications   Medication Instructions    benzonatate (TESSALON PERLES) 100 mg, Oral, Every 8 hours    guaiFENesin (MUCINEX) 1,200 mg, Oral, 2 times daily    No Known Allergies   Social History     Tobacco Use    Smoking status: Every Day     Types: Cigarettes    Smokeless tobacco: Never   Substance Use Topics    Alcohol use: Yes    History reviewed. No pertinent family history.       Objective :                   Vitals I/O      Most Recent Min/Max in 24hrs   Temp   No data recorded   Pulse 62 Pulse  Min: 62  Max: 62   Resp 20 Resp  Min: 20  Max: 20   /93 BP  Min: 129/93  Max: 129/93   O2 Sat 100 % SpO2  Min:  99 %  Max: 100 %    No intake or output data in the 24 hours ending 05/11/25 0506    Diet Cardiovascular; Cardiac    Invasive Monitoring           Physical Exam   Physical Exam  Vitals and nursing note reviewed.   Eyes:      Pupils: Pupils are equal, round, and reactive to light.   Skin:     General: Skin is warm.   HENT:      Head: Normocephalic.   Cardiovascular:      Rate and Rhythm: Normal rate and regular rhythm.      Pulses: Normal pulses.   Musculoskeletal:      Comments: TR band in place right wrist. Cap Refill < 2 sec. No Hematoma Present.    Abdominal:      Palpations: Abdomen is soft.   Constitutional:       Appearance: He is well-developed.   Pulmonary:      Effort: Pulmonary effort is normal.      Breath sounds: Normal breath sounds.   Neurological:      General: No focal deficit present.      Mental Status: He is alert and oriented to person, place and time. Mental status is at baseline.      Motor: gross motor function is at baseline for patient. Strength full and intact in all extremities.          Diagnostic Studies        Lab Results: I have reviewed the following results:     Medications:  Scheduled PRN   [START ON 5/12/2025] aspirin, 81 mg, Daily  atorvastatin, 80 mg, QPM  enoxaparin, 40 mg, Daily  ticagrelor, 90 mg, Q12H NEELIMA      [Transfer Hold] sodium chloride (PF), 3 mL, Q1H PRN       Continuous    sodium chloride, 75 mL/hr         Labs:   CBC    Recent Labs     05/11/25  0306   WBC 11.29*   HGB 14.5   HCT 44.9        BMP    Recent Labs     05/11/25  0306   SODIUM 143   K 3.8      CO2 30   AGAP 11   BUN 16   CREATININE 0.92   CALCIUM 9.5       Coags    Recent Labs     05/11/25  0306   INR 0.83*   PTT 24        Additional Electrolytes  Recent Labs     05/11/25  0306   MG 2.1          Blood Gas    No recent results  No recent results LFTs  No recent results    Infectious  No recent results  Glucose  Recent Labs     05/11/25  0306   GLUC 127        Administrative Statements

## 2025-05-11 NOTE — CONSULTS
Consultation - Cardiology Team 2  Sesar Earl 64 y.o. male MRN: [id number]  Unit/Bed#: BE CATH LAB ROOM Encounter: [id number]  Inpatient consult to Cardiology  Consult performed by: Morris Calvin MD  Consult ordered by: Uvaldo Wahl DO        PCP: No primary care provider on file.   Outpatient Cardiologist: Not following with outpatient cardiology  History of Present Illness   Physician Requesting Consult: Carlos Sandra MD  Reason for Consult / Principal Problem: STEMI    Assessment and Plan      64-year-old male with no reported past medical history presenting with intermittent chest pain for the past several days that progressively became worse and tonight patient had crushing midsternal chest pain with radiation to the left arm.  He was loaded with aspirin prior to arrival subsequently given Brilinta in the emergency department.  EKG suggestive of inferior STEMI because of which patient was transferred to the Cath Lab for revascularization.  Cardiology consulted in the setting.      Assessment & Plan  STEMI (ST elevation myocardial infarction) (HCC)  EKG suggestive of inferior STEMI because of which patient was transferred to the Cath Lab for revascularization.   Initial troponin 0hr at 42, 2 hr and 4hr pending at this time.   -Underwent MUKESH to midRCA  -On ASA and brilinta  -High intensity statin  -Will hold off on beta blocker for now since heart rates around 60s, can consider initiating it later on  -Systolics around 120s at time of cardiac catheterization, will hold off on initiation of anti-hypertensive therapy at this time  -Echo ordered. Other cardiac risk stratification labs including TSH, A1c ordered.  -Continue telemetry monitoring  Hyperlipidemia  Started on high intensity statin.  -Lipid panel from this admission showing , triglycerides 123, HDL 49,   Smoking hx  As reported by patient, daily smoker.    Case discussed and reviewed, Please wait for final recommendations from   [facility]. Thank you for involving us in the care of this patient.    Subjective     HPI: 64-year-old male with no reported past medical history presenting with intermittent chest pain for the past several days that progressively became worse and tonight patient had crushing midsternal chest pain with radiation to the left arm.  He was loaded with aspirin prior to arrival subsequently given Brilinta in the emergency department.  EKG suggestive of inferior STEMI because of which patient was transferred to the Cath Lab for revascularization.  Cardiology consulted in the setting.      Review of Systems  CONSTITUTIONAL: Denies any fever, chills, rigors, and weight loss  HEENT: No earache or tinnitus, denies hearing loss or visual disturbances  CARDIOVASCULAR: As noted in HPI  RESPIRATORY: Denies any cough, hemoptysis  GASTROINTESTINAL: Denies any diarrhea or constipation  GENITOURINARY: No problems with urination, denies any hematuria or dysuria  NEUROLOGIC: No dizziness or vertigo, denies headaches   MUSCULOSKELETAL: Denies any muscle or joint pain   SKIN: Denies skin rashes or itching   ENDOCRINE: Denies excessive thirst, denies intolerance to heat or cold      Objective     Physical Exam  Vitals reviewed.   Cardiovascular:      Rate and Rhythm: Normal rate and regular rhythm.      Pulses: Normal pulses.      Heart sounds: Normal heart sounds.      Comments: No edema in lower extremities. JVP just above clavicle.  Pulmonary:      Effort: Pulmonary effort is normal.      Breath sounds: Normal breath sounds.   Skin:     General: Skin is warm and dry.      Capillary Refill: Capillary refill takes less than 2 seconds.         Vitals:  HR:  [62] 62  BP: (129)/(93) 129/93  Resp:  [20] 20  SpO2:  [99 %-100 %] 100 %  O2 Device: Nasal cannula  Nasal Cannula O2 Flow Rate (L/min):  [2 L/min] 2 L/min  Orthostatic Blood Pressures      Flowsheet Row Most Recent Value   Blood Pressure 129/93 filed at 05/11/2025 0258              Current  Facility-Administered Medications:     [START ON 5/12/2025] aspirin chewable tablet 81 mg, 81 mg, Oral, Daily, Morris Calvin MD    atorvastatin (LIPITOR) tablet 80 mg, 80 mg, Oral, QPM, Morris Calvin MD    enoxaparin (LOVENOX) subcutaneous injection 40 mg, 40 mg, Subcutaneous, Daily, Morris Calvin MD    fentaNYL injection, , Intravenous, Code/Trauma/Sedation Med, Carlos Sandra MD, 50 mcg at 05/11/25 0336    heparin (porcine) injection, , Intravenous, Code/Trauma/Sedation Med, Carlos Sandra MD, 5,000 Units at 05/11/25 0345    lidocaine (PF) (XYLOCAINE-MPF) 1 % injection, , Infiltration, Code/Trauma/Sedation Med, Carlos Sandra MD, 1 mL at 05/11/25 0336    midazolam (VERSED) injection, , Intravenous, Code/Trauma/Sedation Med, Carlos Sandra MD, 2 mg at 05/11/25 0336    nitroGLYcerin 200 mcg/mL IA bolus, , Intra-arterial, Code/Trauma/Sedation Med, Carlos Sandra MD, 200 mcg at 05/11/25 0337    nitroGLYcerin 200 mcg/mL IC bolus, , Intracoronary, Code/Trauma/Sedation Med, Carlos Sandra MD, 400 mcg at 05/11/25 0402    Insert peripheral IV, , , Once **AND** [Transfer Hold] sodium chloride (PF) 0.9 % injection 3 mL, 3 mL, Intravenous, Q1H PRN, Uvaldo Wahl DO    sodium chloride 0.9 % infusion, 75 mL/hr, Intravenous, Continuous, Uvaldo Wahl DO    ticagrelor (BRILINTA) tablet 90 mg, 90 mg, Oral, Q12H NEELIMA, Morris Calvin MD    verapamil (ISOPTIN) injection, , Intra-arterial, Code/Trauma/Sedation Med, Carlos Sandra MD, 1.25 mg at 05/11/25 0337    Cardiac Imaging/Monitoring     Telemetry:   Personally reviewed by Morris Calvin MD: Sinus rhythm with rate around 60-70s      Morris Calvin MD  Cardiovascular Disease Fellow, FY-1

## 2025-05-11 NOTE — QUICK NOTE
Patient seen and examined post cath.  Briefly, he reports intermittent chest burning and pressure with radiation to the left arm for 3 days prior to presentation.  This morning around 0200 his chest pain became severe with radiation to the left arm.  On presentation ECG showed inferior STEMI.  He went for urgent cardiac cath and had stenting of his mid RCA.  Final cath report pending. Wrist site stable without hematoma and good distal perfusion.    Past medical history: Patient does not follow with a primary care provider and was on no medications prior to presentation.  He has been smoking since his teenage years currently half a pack per day.      Assessment/plan:  #.  Inferior STEMI  #.  Hypercholesterolemia  #.  Current everyday smoker  #.  Prediabetes: Hemoglobin A1c 5.8%    Smoking cessation counseling performed.  Declines nicotine patch. Continue with dual antiplatelet therapy with aspirin 81 mg and ticagrelor 90 mg twice daily, high intensity statin.  Currently sinus bradycardia on telemetry-continue to monitor for initiation of beta-blocker within 24 hours.  Follow-up transthoracic echocardiogram. Repeat ECG post PCI.

## 2025-05-12 PROBLEM — R73.03 PRE-DIABETES: Status: ACTIVE | Noted: 2025-05-12

## 2025-05-12 LAB
ATRIAL RATE: 61 BPM
P AXIS: 60 DEGREES
PR INTERVAL: 186 MS
QRS AXIS: 8 DEGREES
QRSD INTERVAL: 88 MS
QT INTERVAL: 408 MS
QTC INTERVAL: 410 MS
T WAVE AXIS: 92 DEGREES
VENTRICULAR RATE: 61 BPM

## 2025-05-12 PROCEDURE — 97163 PT EVAL HIGH COMPLEX 45 MIN: CPT

## 2025-05-12 PROCEDURE — 93010 ELECTROCARDIOGRAM REPORT: CPT | Performed by: INTERNAL MEDICINE

## 2025-05-12 PROCEDURE — 97166 OT EVAL MOD COMPLEX 45 MIN: CPT

## 2025-05-12 PROCEDURE — 99232 SBSQ HOSP IP/OBS MODERATE 35: CPT | Performed by: INTERNAL MEDICINE

## 2025-05-12 RX ADMIN — TICAGRELOR 90 MG: 90 TABLET ORAL at 08:42

## 2025-05-12 RX ADMIN — CHLORHEXIDINE GLUCONATE 15 ML: 1.2 SOLUTION ORAL at 08:42

## 2025-05-12 RX ADMIN — CHLORHEXIDINE GLUCONATE 15 ML: 1.2 SOLUTION ORAL at 20:14

## 2025-05-12 RX ADMIN — ASPIRIN 81 MG CHEWABLE TABLET 81 MG: 81 TABLET CHEWABLE at 08:42

## 2025-05-12 RX ADMIN — ENOXAPARIN SODIUM 40 MG: 40 INJECTION SUBCUTANEOUS at 08:42

## 2025-05-12 RX ADMIN — TICAGRELOR 90 MG: 90 TABLET ORAL at 20:13

## 2025-05-12 RX ADMIN — ATORVASTATIN CALCIUM 80 MG: 80 TABLET, FILM COATED ORAL at 17:36

## 2025-05-12 NOTE — RESTORATIVE TECHNICIAN NOTE
Restorative Technician Note      Patient Name: Sesar Earl     Restorative Tech Visit Date: 05/12/25  Note Type: Mobility  Patient Position Upon Consult: Supine  Activity Performed: Ambulated  Assistive Device: Other (Comment) (none)  Patient Position at End of Consult: All needs within reach; Seated edge of bed

## 2025-05-12 NOTE — ASSESSMENT & PLAN NOTE
Patient with chest pain, EKG suggestive of inferior STEMI  Status post left heart cath, MUKESH to RCA  Telemetry shows sinus bradycardia with ventricular escape rhythm intermittently  Currently chest pain-free  Risk factors: Smoker, prediabetes, hyperlipidemia

## 2025-05-12 NOTE — UTILIZATION REVIEW
Initial Clinical Review    Admission: Date/Time/Statement:   Admission Orders (From admission, onward)       Ordered        05/11/25 0357  Inpatient Admission  Once                          Orders Placed This Encounter   Procedures    Inpatient Admission     Standing Status:   Standing     Number of Occurrences:   1     Level of Care:   Level 1 Stepdown [13]     Estimated length of stay:   More than 2 Midnights     Certification:   I certify that inpatient services are medically necessary for this patient for a duration of greater than two midnights. See H&P and MD Progress Notes for additional information about the patient's course of treatment.     ED Arrival Information       Expected   -    Arrival   5/11/2025 02:52    Acuity   Emergent              Means of arrival   Ambulance    Escorted by   [facility] ([facility])    Service   Cardiology    Admission type   Emergency              Arrival complaint   STEMI Alert             Chief Complaint   Patient presents with    Chest Pain     Per ems - patient has had CP for 3-4 days, progressively gotten worse this morning       Initial Presentation: 64 y.o. male with no PMHx who presented on 5/11/25 to ED due to intermittent chest pain for the past several days. On 5/11, developed crushing midsternal chest pain with radiation to the left arm. Prehospital EKG shows inferior lateral STEMI. Aspirin given prior to arrival. In the ED, Cardiology consulted and taken to cath lab for revascularization. Trop 42--20,989, >22,973.     Plan:  Admit Inpatient status Dx Inferior STEMI:   Critical Care unit, Cardiology following, Underwent MUKESH to [facility]. On ASA, Brilinta and satin. Hold BB, order echo, TSH and A1c, continue telemetry. Encourage tobacco cessation.     Date: 5/12   Day 2:  No significant events overnight. Continue telemetry, Echo with EF 55%, Normal wall motion  Cardiac rehab referral at time of DC.      ED Treatment-Medication Administration from 05/11/2025  0250 to 05/11/2025 0323         Date/Time Order Dose Route Action     05/11/2025 0305 heparin (porcine) injection 4,000 Units 4,000 Units Intravenous Given     05/11/2025 0257 ticagrelor (BRILINTA) tablet 180 mg 180 mg Oral Given     05/11/2025 0302 HYDROmorphone (DILAUDID) injection 0.5 mg 0.5 mg Intravenous Given            Scheduled Medications:  aspirin, 81 mg, Oral, Daily  atorvastatin, 80 mg, Oral, QPM  chlorhexidine, 15 mL, Mouth/Throat, Q12H NEELIMA  enoxaparin, 40 mg, Subcutaneous, Daily  nicotine, 21 mg, Transdermal, Daily  ticagrelor, 90 mg, Oral, Q12H NEELIMA      Continuous IV Infusions: none     PRN Meds: none     ED Triage Vitals   Temperature Pulse Respirations Blood Pressure SpO2 Pain Score   05/11/25 0630 05/11/25 0258 05/11/25 0258 05/11/25 0258 05/11/25 0258 05/11/25 0300   98.8 °F (37.1 °C) 62 20 129/93 99 % 9     Weight (last 2 days)       Date/Time Weight    05/12/25 0626 65.6 (144.62)    05/11/25 0930 64.9 (143)    05/11/25 0509 65 (143.3)            Vital Signs (last 3 days)       Date/Time Temp Pulse Resp BP MAP (mmHg) SpO2 Calculated FIO2 (%) - Nasal Cannula Nasal Cannula O2 Flow Rate (L/min) O2 Device Grover Coma Scale Score Pain    05/12/25 07:28:34 98.1 °F (36.7 °C) 54 -- 99/55 70 97 % -- -- -- -- --    05/12/25 02:28:04 98 °F (36.7 °C) 57 -- 113/74 87 97 % -- -- -- -- --    05/11/25 22:09:05 98.9 °F (37.2 °C) 60 15 106/59 75 99 % -- -- None (Room air) -- --    05/11/25 2030 -- -- -- -- -- -- -- -- -- 15 1 05/11/25 18:44:19 98.4 °F (36.9 °C) 57 -- 110/61 77 97 % -- -- -- -- --    05/11/25 1600 -- 53 16 111/61 82 96 % -- -- -- -- --    05/11/25 1500 -- 52 21 104/63 80 96 % -- -- -- -- --    05/11/25 1458 98.2 °F (36.8 °C) -- -- -- -- -- -- -- -- -- --    05/11/25 1200 -- 54 18 117/70 88 97 % -- -- -- 15 --    05/11/25 1103 98.2 °F (36.8 °C) 54 20 110/67 84 96 % -- -- -- -- --    05/11/25 1000 -- 55 19 105/63 79 97 % -- -- -- -- --    05/11/25 0930 -- 55 -- 106/64 -- -- -- -- -- -- --     05/11/25 0900 -- 59 18 109/63 81 96 % -- -- -- -- --    05/11/25 0800 -- 57 17 101/59 78 -- -- -- -- -- --    05/11/25 0730 -- 54 17 107/66 82 -- -- -- -- 15 3    05/11/25 0700 -- 60 16 120/68 87 -- -- -- -- -- --    05/11/25 0630 98.8 °F (37.1 °C) 63 16 108/66 83 -- -- -- -- -- --    05/11/25 0615 -- 59 17 118/66 88 97 % -- -- -- -- --    05/11/25 0600 -- 62 18 105/65 80 96 % -- -- -- -- --    05/11/25 0545 -- 58 18 104/65 80 95 % -- -- -- -- --    05/11/25 0515 -- 66 23 115/69 88 96 % -- -- -- -- --    05/11/25 0500 -- 68 18 124/73 92 97 % -- -- -- -- --    05/11/25 0445 -- 70 21 117/70 88 98 % -- -- -- 15 --    05/11/25 04:16:03 -- -- -- -- -- -- -- -- -- -- 4 05/11/25 03:35:16 -- -- -- -- -- -- -- -- -- -- 5 05/11/25 03:33:44 -- -- -- -- -- 100 % 28 2 L/min Nasal cannula -- --    05/11/25 0302 -- -- -- -- -- -- -- -- -- -- 9 05/11/25 03:00:11 -- -- -- -- -- -- -- -- -- -- 9 05/11/25 0258 -- 62 20 129/93 -- 99 % -- -- None (Room air) -- --              Pertinent Labs/Diagnostic Test Results:   Radiology:  XR chest 1 view portable   ED Interpretation by Uvaldo Wahl DO (05/11 0317)   No infiltrate, no pneumothorax, no effusion.  No widened mediastinum.      Final Interpretation by Greg Redd MD (05/11 7322)      No acute cardiopulmonary disease.            Workstation performed: [id number]           Cardiology:  Echo complete   Final Result by Mayank Malcolm MD (05/11 1921)        Left Ventricle: Left ventricular cavity size is normal. Wall thickness    is increased. There is moderate asymmetric hypertrophy of the septal wall.    The left ventricular ejection fraction is 55%. Systolic function is    normal. Wall motion is normal. Diastolic function is normal.     Mitral Valve: There is mild regurgitation.     Tricuspid Valve: There is mild regurgitation.     Pulmonic Valve: There is mild regurgitation.         Cardiac catheterization   Final Result by Carlos Sandra MD (05/12 4666)      ECG 12  lead    by Interface, Ris Results In (05/11 0300)        GI:  No orders to display           Results from last 7 days   Lab Units 05/11/25  0502 05/11/25  0306   WBC Thousand/uL  --  11.29*   HEMOGLOBIN g/dL  --  14.5   HEMATOCRIT %  --  44.9   PLATELETS Thousands/uL 175 226   TOTAL NEUT ABS Thousands/µL  --  5.19         Results from last 7 days   Lab Units 05/11/25  0306   SODIUM mmol/L 143   POTASSIUM mmol/L 3.8   CHLORIDE mmol/L 102   CO2 mmol/L 30   ANION GAP mmol/L 11   BUN mg/dL 16   CREATININE mg/dL 0.92   EGFR ml/min/1.73sq m 88   CALCIUM mg/dL 9.5   MAGNESIUM mg/dL 2.1             Results from last 7 days   Lab Units 05/11/25  0306   GLUCOSE RANDOM mg/dL 127         Results from last 7 days   Lab Units 05/11/25  0306   HEMOGLOBIN A1C % 5.8*   EAG mg/dl 120       Results from last 7 days   Lab Units 05/11/25  0814 05/11/25  0502 05/11/25  0306   HS TNI 0HR ng/L  --   --  42   HS TNI 2HR ng/L  --  20,989*  --    HSTNI D2 ng/L  --  20,947*  --    HS TNI 4HR ng/L >22,973*  --   --    HSTNI D4 ng/L >22,931*  --   --          Results from last 7 days   Lab Units 05/11/25  0306   PROTIME seconds 11.8*   INR  0.83*   PTT seconds 24     Results from last 7 days   Lab Units 05/11/25  0306   TSH 3RD GENERATON uIU/mL 6.859*     History reviewed. No pertinent past medical history.  Present on Admission:  **None**      Admitting Diagnosis: Chest pain [R07.9]  STEMI (ST elevation myocardial infarction) (HCC) [I21.3]  Age/Sex: 64 y.o. male    Network Utilization Review Department  ATTENTION: Please call with any questions or concerns to [phone] and carefully listen to the prompts so that you are directed to the right person. All voicemails are confidential.   For Discharge needs, contact Care Management DC Support Team at [phone] opt. 2  Send all requests for admission clinical reviews, approved or denied determinations and any other requests to dedicated fax number below belonging to the campus where the  patient is receiving treatment. List of dedicated fax numbers for the Facilities:  FACILITY NAME UR FAX NUMBER   ADMISSION DENIALS (Administrative/Medical Necessity) [phone]   DISCHARGE SUPPORT TEAM (NETWORK) [phone]   PARENT CHILD HEALTH (Maternity/NICU/Pediatrics) [phone]   [facility] [phone]   [facility] [phone]   [facility] [phone]   [facility] [phone]   [facility] [phone]   [facility] [phone]   [facility] [phone]   [facility] [phone]   [facility] [phone]   [facility] [phone]   [facility] [phone]   [facility] [phone]

## 2025-05-12 NOTE — PROGRESS NOTES
Progress Note - Cardiology   Name: Sesar Earl 64 y.o. male I MRN: [id number]  Unit/Bed#: [facility]-316-01 I Date of Admission: 5/11/2025   Date of Service: 5/12/2025 I Hospital Day: 1       24 hour events  /74, hear rate 54-57  Telemetry: Bradycardia, with intermittent ventricular escape rhythm  Echo: EF 55%, Normal wall motion  Labs: no labs this am, creatinine 0.92 on presentation    Assessment & Plan  STEMI (ST elevation myocardial infarction) (HCC)  Patient with chest pain, EKG suggestive of inferior STEMI  Status post left heart cath, MUKESH to RCA  Telemetry shows sinus bradycardia with ventricular escape rhythm intermittently  Currently chest pain-free  Risk factors: Smoker, prediabetes, hyperlipidemia    Hyperlipidemia  Lipid panel from this admission showing , triglycerides 123, HDL 49,   Smoking hx  daily smoker.  Pre-diabetes  HBA1C 5.8    Plan  Aspirin, brillinta, will price check brilinta  Atorvastatin 80 mg daily  Hold betablocker in the setting of bradycardia.  Continue to monitor on telemetry, and ambulate patient.  If ventricular escape's persist, will likely need outpatient Zio patch  Discussed in detail with the patient regarding tobacco cessation.  Patient is willing to quit, declines assistance.  Cardiac rehab referral on discharge    Summary  64-year-old male with no reported past medical history presenting with intermittent chest pain for the past several days that progressively became worse and tonight patient had crushing midsternal chest pain with radiation to the left arm. He was loaded with aspirin prior to arrival subsequently given Brilinta in the emergency department. EKG suggestive of inferior STEMI because of which patient was transferred to the Cath Lab for revascularization.     Subjective:   Patient seen and examined.  No significant events overnight.      Objective:     Vitals: Blood pressure 99/55, pulse (!) 54, temperature 98.1 °F (36.7 °C), resp. rate 15, height 5'  "9.5\" (1.765 m), weight 65.6 kg (144 lb 10 oz), SpO2 97%., Body mass index is 21.05 kg/m².,   Orthostatic Blood Pressures      Flowsheet Row Most Recent Value   Blood Pressure 99/55 filed at 05/12/2025 0728              Intake/Output Summary (Last 24 hours) at 5/12/2025 1003  Last data filed at 5/11/2025 2001  Gross per 24 hour   Intake 522 ml   Output 0 ml   Net 522 ml           Physical Exam:    GEN: Sesar Earl appears well, alert and oriented x 3, pleasant and cooperative   HEENT: pupils equal, round, and reactive to light; extraocular muscles intact  NECK: supple, no carotid bruits   HEART: regular rhythm, normal S1 and S2, no murmurs, clicks, gallops or rubs   LUNGS: clear to auscultation bilaterally; no wheezes, rales, or rhonchi   ABDOMEN: normal bowel sounds, soft, no tenderness, no distention  EXTREMITIES: peripheral pulses normal; no clubbing, cyanosis, or edema  NEURO: no focal findings   SKIN: normal without suspicious lesions on exposed skin    Medications:      Current Facility-Administered Medications:     aspirin chewable tablet 81 mg, 81 mg, Oral, Daily, Morris Calvin MD, 81 mg at 05/12/25 0842    atorvastatin (LIPITOR) tablet 80 mg, 80 mg, Oral, QPM, Morris Calvin MD, 80 mg at 05/11/25 1821    chlorhexidine (PERIDEX) 0.12 % oral rinse 15 mL, 15 mL, Mouth/Throat, Q12H [facility], Al Johnson PA-C, 15 mL at 05/12/25 0842    enoxaparin (LOVENOX) subcutaneous injection 40 mg, 40 mg, Subcutaneous, Daily, Morris Calvin MD, 40 mg at 05/12/25 0842    nicotine (NICODERM CQ) 21 mg/24 hr TD 24 hr patch 21 mg, 21 mg, Transdermal, Daily, Al Johnson PA-C    ticagrelor (BRILINTA) tablet 90 mg, 90 mg, Oral, Q12H [facility], Morris Calvin MD, 90 mg at 05/12/25 0842     Labs & Results:        Results from last 7 days   Lab Units 05/11/25  0502 05/11/25  0306   WBC Thousand/uL  --  11.29*   HEMOGLOBIN g/dL  --  14.5   HEMATOCRIT %  --  44.9   PLATELETS Thousands/uL 175 226     Results from last 7 days   Lab Units " 05/11/25  0306   TRIGLYCERIDES mg/dL 123   HDL mg/dL 49     Results from last 7 days   Lab Units 05/11/25  0306   POTASSIUM mmol/L 3.8   CHLORIDE mmol/L 102   CO2 mmol/L 30   BUN mg/dL 16   CREATININE mg/dL 0.92   CALCIUM mg/dL 9.5     Results from last 7 days   Lab Units 05/11/25  0306   INR  0.83*   PTT seconds 24     Results from last 7 days   Lab Units 05/11/25  0306   MAGNESIUM mg/dL 2.1       EKG personally reviewed by Faustino Gómez MD.

## 2025-05-12 NOTE — PHYSICAL THERAPY NOTE
"   PHYSICAL THERAPY EVALUATION  NAME:  Sesar Earl  DATE: 05/12/25    AGE:   64 y.o.  Mrn:   [id number]  ADMIT DX:  Chest pain [R07.9]  STEMI (ST elevation myocardial infarction) (HCC) [I21.3]    History reviewed. No pertinent past medical history.  History reviewed. No pertinent surgical history.    Length Of Stay: 1  PHYSICAL THERAPY EVALUATION :    05/12/25 0954   PT Last Visit   PT Visit Date 05/12/25   Note Type   Note type Evaluation   End of Consult   Patient Position at End of Consult All needs within reach   Pain Assessment   Pain Assessment Tool 0-10   Pain Score No Pain   Restrictions/Precautions   Weight Bearing Precautions Per Order No   Other Precautions Cardiac/sternal;Telemetry;Multiple lines   Home Living   Type of Home House   Home Layout Two level;Stairs to enter with rails   Bathroom Shower/Tub Tub/shower unit   Bathroom Toilet Standard   Bathroom Accessibility Accessible   Home Equipment   (none)   Prior Function   Level of [place] Independent with ADLs;Independent with functional mobility;Independent with IADLS   Lives With Alone   Receives Help From Family  (son checks in)   IADLs Independent with driving;Independent with meal prep;Independent with medication management   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   Cognition   Overall Cognitive Status WFL   Attention Attends with cues to redirect   Orientation Level Oriented X4   Memory Decreased recall of precautions   Following Commands Follows one step commands without difficulty   Comments cooperative- mildly impulsive   Subjective   Subjective \"Im good\" \"I can walk fine:   RUE Assessment   RUE Assessment WFL   LUE Assessment   LUE Assessment WFL   RLE " Assessment   RLE Assessment WFL   LLE Assessment   LLE Assessment WFL   Coordination   Movements are Fluid and Coordinated 1   Sensation WFL   Light Touch   RLE Light Touch Grossly intact   LLE Light Touch Grossly intact   Proprioception   RLE Proprioception Grossly intact   LLE Proprioception Grossly Intact   Bed Mobility   Supine to Sit 7  Independent   Transfers   Sit to Stand 7  Independent   Stand to Sit 7  Independent   Additional Comments w/o AD   Ambulation/Elevation   Gait pattern WNL   Gait Assistance 5  Supervision  (> indep w/o AD)   Assistive Device None   Distance > 400' + turns/ obstacles.w/o LOB; stable vitals   Stair Management Assistance 7  Independent   Balance   Static Sitting Normal   Dynamic Sitting Good   Static Standing Fair +   Dynamic Standing Fair +   Ambulatory Fair +   Endurance Deficit   Endurance Deficit No   Activity Tolerance   Activity Tolerance Patient tolerated treatment well   Medical Staff Made Aware OT and RN + CM for d/c planning recommendations   Nurse Made Aware yes   Assessment   Prognosis Good   Assessment Pt is 64 y.o. male seen for PT evaluation s/p admit to [facility] on 5/11/2025  w/  intermittent chest pain for the past several days. On 5/11, developed crushing midsternal chest pain with radiation to the left arm. Prehospital EKG shows inferior lateral STEMI .   Pt  has no past medical history on file.  PT consulted for mobility and to assist ww/ d/c planning recommendations.  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 Currently,  pt  is requiring I A for bed skills; I for functional transfers and I> S for ambulation w/ o AD home + community distances on RA; pt able to p/u object from floor w/o LOB + negotiate steps .   Pt presents functioning at home  baseline and currently w/ overall  mobility deficits 2* to: multiple lines; impulsivity; limited insight into deficits/ medical  (Please find additional objective findings from PT assessment regarding body systems outlined above.) Pt  is S> indep w/o skilled PT needs in acute setting indicated- recommend ad suhas ambulation vs ambulation w/ nursing or restorative staff 3-4x daily to maintain CLOF until time of d/c. PT signing off/ no needs on d/c. Pt in agreement and reports no concerns for d/c.   Goals   Patient Goals go home   Discharge Recommendation   Rehab Resource Intensity Level, PT No post-acute rehabilitation needs   AM-PAC Basic Mobility Inpatient   Turning in Flat Bed Without Bedrails 4   Lying on Back to Sitting on Edge of Flat Bed Without Bedrails 4   Moving Bed to Chair 4   Standing Up From Chair Using Arms 4   Walk in Room 4   Climb 3-5 Stairs With Railing 4   Basic Mobility Inpatient Raw Score 24   Basic Mobility Standardized Score 57.68   [facility] Highest Level Of Mobility   -[facility] Goal 8: Walk 250 feet or more   -HLM Achieved 8: Walk 250 feet ot more       The patient's AM-PAC Basic Mobility Inpatient Short Form Raw Score is 24. A Raw score of greater than 16 suggests the patient may benefit from discharge to home. Please also refer to the recommendation of the Physical Therapist for safe discharge planning.

## 2025-05-12 NOTE — OCCUPATIONAL THERAPY NOTE
"    Occupational Therapy Evaluation     Patient Name: Sesar Earl  Today's Date: 5/12/2025  Problem List  Active Problems:    STEMI (ST elevation myocardial infarction) (HCC)    Hyperlipidemia    Smoking hx    Pre-diabetes    Past Medical History  History reviewed. No pertinent past medical history.  Past Surgical History  History reviewed. No pertinent surgical history.          05/12/25 0955   OT Last Visit   OT Visit Date 05/12/25   Note Type   Note type Evaluation   Pain Assessment   Pain Assessment Tool 0-10   Pain Score No Pain   Restrictions/Precautions   Weight Bearing Precautions Per Order No   Other Precautions Cardiac/sternal;Impulsive;Telemetry   Home Living   Type of Home House   Home Layout Two level;Stairs to enter with rails  (2 GEORGE, FOS to 2nd fl)   Bathroom Shower/Tub Tub/shower unit   Bathroom Toilet Standard   Bathroom Accessibility Accessible   Prior Function   Level of [place] Independent with ADLs;Independent with functional mobility;Independent with IADLS   Lives With Alone   Receives Help From Family  (son checks in)   IADLs Independent with driving;Independent with meal prep;Independent with medication management   Falls in the last 6 months 1 to 4   Vocational Part time employment   Lifestyle   Autonomy Pta pt I with ADL, IADL and functional mobility, (+) .   Reciprocal Relationships supportive son   Service to Others works PT- construction   Subjective   Subjective \"I am fine\"   ADL   Where Assessed Edge of bed   Eating Assistance 7  Independent   Grooming Assistance 6  Modified Independent   UB Bathing Assistance 6  Modified Independent   LB Bathing Assistance 5  Supervision/Setup   UB Dressing Assistance 6  Modified independent   LB Dressing Assistance 5  Supervision/Setup   Toileting Assistance  6  Modified independent   Functional Assistance 6  Modified independent   Bed Mobility   Supine to Sit 6  Modified independent   Additional Comments Pt greeted in bed, left seated " EOB with all needs within reach.   Transfers   Sit to Stand 6  Modified independent   Stand to Sit 6  Modified independent   Additional Comments no AD   Functional Mobility   Functional Mobility 5  Supervision   Additional Comments Pt performs household distance mobility with supervision without AD.   Additional items   (no AD)   Balance   Static Sitting Good   Dynamic Sitting Fair +   Static Standing Fair   Dynamic Standing Fair -   Ambulatory Fair -   Activity Tolerance   Activity Tolerance Patient tolerated treatment well   Medical Staff Made Aware Co-eval with DPT due to medical complexity.   Nurse Made Aware RN cleared.   RUE Assessment   RUE Assessment WFL   LUE Assessment   LUE Assessment WFL   Hand Function   Gross Motor Coordination Functional   Fine Motor Coordination Functional   Sensation   Light Touch No apparent deficits   Cognition   Overall Cognitive Status WFL   Arousal/Participation Cooperative;Alert   Attention Attends with cues to redirect   Orientation Level Oriented X4   Memory Decreased recall of precautions   Following Commands Follows one step commands without difficulty   Comments Pt cooperative to therapy although impulsive at times, has support at home.   Assessment   Prognosis Good   Assessment Pt is a 64 y.o. male who was admitted to [facility] on 5/11/2025 with STEMI, s/p LHC with MUKESH to mRCA. Pt seen for an OT evaluation per active OT orders.  Pt with PMH including pre-diabetes and hyperlipidemia.   Pt lives in a [facility] with 2 GEORGE, uses a tub shower and standard toilet. Pta, pt was independent w/ ADL/IADL and functional mobility, was (+) driving and was not using any DME at baseline. Currently, pt is Mod I for UB ADL, Supervision for LB ADL, and completed transfers/FM w Supervision. The patient's raw score on the AM-PAC Daily Activity Inpatient Short Form is 22. A raw score of greater than or equal to 19 suggests the patient may benefit from discharge to home. Please refer to  the recommendation of the Occupational Therapist for safe discharge planning. Pt is likely close to functional baseline, indicating no further acute OT needs at this time, d/c acute OT. From OT standpoint, recommend home with social support, no further OT needs upon D/C. Pt was left sitting EOB with all needs within reach.   Goals   Patient Goals to go home   Plan   OT Frequency Eval only   Discharge Recommendation   Rehab Resource Intensity Level, OT No post-acute rehabilitation needs   AM-PAC Daily Activity Inpatient   Lower Body Dressing 3   Bathing 3   Toileting 4   Upper Body Dressing 4   Grooming 4   Eating 4   Daily Activity Raw Score 22   Daily Activity Standardized Score (Calc for Raw Score >=11) 47.1   AM-PAC Applied Cognition Inpatient   Following a Speech/Presentation 4   Understanding Ordinary Conversation 4   Taking Medications 4   Remembering Where Things Are Placed or Put Away 4   Remembering List of 4-5 Errands 4   Taking Care of Complicated Tasks 3   Applied Cognition Raw Score 23   Applied Cognition Standardized Score 53.08   End of Consult   Education Provided Yes   Patient Position at End of Consult Seated edge of bed;Bed/Chair alarm activated;All needs within reach   Nurse Communication Nurse aware of consult       ASHLY Sullivan, OTR/L

## 2025-05-13 VITALS
DIASTOLIC BLOOD PRESSURE: 67 MMHG | SYSTOLIC BLOOD PRESSURE: 107 MMHG | RESPIRATION RATE: 17 BRPM | OXYGEN SATURATION: 99 % | WEIGHT: 137.57 LBS | HEIGHT: 70 IN | HEART RATE: 56 BPM | TEMPERATURE: 97.9 F | BODY MASS INDEX: 19.69 KG/M2

## 2025-05-13 LAB
ANION GAP SERPL CALCULATED.3IONS-SCNC: 8 MMOL/L (ref 4–13)
BUN SERPL-MCNC: 16 MG/DL (ref 5–25)
CALCIUM SERPL-MCNC: 9.7 MG/DL (ref 8.4–10.2)
CHLORIDE SERPL-SCNC: 103 MMOL/L (ref 96–108)
CO2 SERPL-SCNC: 26 MMOL/L (ref 21–32)
CREAT SERPL-MCNC: 0.89 MG/DL (ref 0.6–1.3)
GFR SERPL CREATININE-BSD FRML MDRD: 91 ML/MIN/1.73SQ M
GLUCOSE SERPL-MCNC: 103 MG/DL (ref 65–140)
KCT BLD-ACNC: 338 SEC (ref 89–137)
POTASSIUM SERPL-SCNC: 4.3 MMOL/L (ref 3.5–5.3)
SODIUM SERPL-SCNC: 137 MMOL/L (ref 135–147)
SPECIMEN SOURCE: ABNORMAL

## 2025-05-13 PROCEDURE — 99238 HOSP IP/OBS DSCHRG MGMT 30/<: CPT | Performed by: INTERNAL MEDICINE

## 2025-05-13 PROCEDURE — 80048 BASIC METABOLIC PNL TOTAL CA: CPT | Performed by: STUDENT IN AN ORGANIZED HEALTH CARE EDUCATION/TRAINING PROGRAM

## 2025-05-13 RX ORDER — CLOPIDOGREL 300 MG/1
300 TABLET, FILM COATED ORAL ONCE
Status: COMPLETED | OUTPATIENT
Start: 2025-05-13 | End: 2025-05-13

## 2025-05-13 RX ORDER — NICOTINE 21 MG/24HR
1 PATCH, TRANSDERMAL 24 HOURS TRANSDERMAL DAILY
Qty: 10 PATCH | Refills: 0 | Status: CANCELLED | OUTPATIENT
Start: 2025-05-14

## 2025-05-13 RX ORDER — ASPIRIN 81 MG/1
81 TABLET, CHEWABLE ORAL DAILY
Qty: 60 TABLET | Refills: 0 | Status: SHIPPED | OUTPATIENT
Start: 2025-05-14

## 2025-05-13 RX ORDER — ATORVASTATIN CALCIUM 80 MG/1
80 TABLET, FILM COATED ORAL EVERY EVENING
Qty: 60 TABLET | Refills: 0 | Status: CANCELLED | OUTPATIENT
Start: 2025-05-13

## 2025-05-13 RX ORDER — ATORVASTATIN CALCIUM 80 MG/1
80 TABLET, FILM COATED ORAL EVERY EVENING
Qty: 30 TABLET | Refills: 0 | Status: SHIPPED | OUTPATIENT
Start: 2025-05-13

## 2025-05-13 RX ORDER — NICOTINE 21 MG/24HR
1 PATCH, TRANSDERMAL 24 HOURS TRANSDERMAL DAILY
Qty: 7 PATCH | Refills: 0 | Status: CANCELLED | OUTPATIENT
Start: 2025-05-14

## 2025-05-13 RX ORDER — CLOPIDOGREL BISULFATE 75 MG/1
75 TABLET ORAL DAILY
Qty: 60 TABLET | Refills: 0 | Status: SHIPPED | OUTPATIENT
Start: 2025-05-14

## 2025-05-13 RX ORDER — ASPIRIN 81 MG/1
81 TABLET, CHEWABLE ORAL DAILY
Qty: 60 TABLET | Refills: 0 | Status: CANCELLED | OUTPATIENT
Start: 2025-05-14

## 2025-05-13 RX ORDER — CLOPIDOGREL BISULFATE 75 MG/1
75 TABLET ORAL DAILY
Qty: 60 TABLET | Refills: 0 | Status: CANCELLED | OUTPATIENT
Start: 2025-05-14

## 2025-05-13 RX ORDER — NICOTINE 21 MG/24HR
1 PATCH, TRANSDERMAL 24 HOURS TRANSDERMAL DAILY
Qty: 7 PATCH | Refills: 0 | Status: SHIPPED | OUTPATIENT
Start: 2025-05-14

## 2025-05-13 RX ORDER — CLOPIDOGREL BISULFATE 75 MG/1
75 TABLET ORAL DAILY
Status: DISCONTINUED | OUTPATIENT
Start: 2025-05-14 | End: 2025-05-13 | Stop reason: HOSPADM

## 2025-05-13 RX ADMIN — ASPIRIN 81 MG CHEWABLE TABLET 81 MG: 81 TABLET CHEWABLE at 09:40

## 2025-05-13 RX ADMIN — CHLORHEXIDINE GLUCONATE 15 ML: 1.2 SOLUTION ORAL at 09:39

## 2025-05-13 RX ADMIN — ENOXAPARIN SODIUM 40 MG: 40 INJECTION SUBCUTANEOUS at 09:39

## 2025-05-13 RX ADMIN — CLOPIDOGREL BISULFATE 300 MG: 300 TABLET, FILM COATED ORAL at 09:39

## 2025-05-13 NOTE — PLAN OF CARE
Problem: CARDIOVASCULAR - ADULT  Goal: Maintains optimal cardiac output and hemodynamic stability  Description: INTERVENTIONS:- Monitor I/O, vital signs and rhythm- Monitor for S/S and trends of decreased cardiac output- Administer and titrate ordered vasoactive medications to optimize hemodynamic stability- Assess quality of pulses, skin color and temperature- Assess for signs of decreased coronary artery perfusion- Instruct patient to report change in severity of symptoms  Outcome: Progressing     Problem: CARDIOVASCULAR - ADULT  Goal: Absence of cardiac dysrhythmias or at baseline rhythm  Description: INTERVENTIONS:- Continuous cardiac monitoring, vital signs, obtain 12 lead EKG if ordered- Administer antiarrhythmic and heart rate control medications as ordered- Monitor electrolytes and administer replacement therapy as ordered  Outcome: Progressing

## 2025-05-13 NOTE — CASE MANAGEMENT
Case Management Discharge Planning Note    Patient name Sesar Earl  [facility] [id number]  : 1960 Date 2025       Current Admission Date: 2025  Current Admission Diagnosis:STEMI (ST elevation myocardial infarction) (HCC)   Patient Active Problem List    Diagnosis Date Noted Date Diagnosed    Pre-diabetes 2025     STEMI (ST elevation myocardial infarction) (HCC) 2025     Hyperlipidemia 2025     Smoking hx 2025       LOS (days): 2  Geometric Mean LOS (GMLOS) (days): 2  Days to GMLOS:-0.4     OBJECTIVE:  Risk of Unplanned Readmission Score: 7.84         Current admission status: Inpatient   Preferred Pharmacy:   [id number]  [address]  [facility]    [place] [address]  Phone: [phone] Fax: [phone]    Primary Care Provider: No primary care provider on file.    Primary Insurance: [employer] PENDING  Secondary Insurance:     DISCHARGE DETAILS:    Discharge planning discussed with:: patient  Freedom of Choice: Yes                                            Treatment Team Recommendation: Home  Discharge Destination Plan:: Home  Transport at Discharge : Ride Share           ETA of Transport (Date): 25  ETA of Transport (Time): 2056

## 2025-05-13 NOTE — ASSESSMENT & PLAN NOTE
Patient with chest pain, EKG suggestive of inferior STEMI  Status post left heart cath, MUKESH to RCA  Telemetry shows sinus bradycardia with ventricular escape rhythm intermittently  Currently chest pain-free  Risk factors: Smoker, prediabetes, hyperlipidemia  TTE: EF 55%.       Orders from past 72 hours:    Consult to Cardiology; Standing    Inpatient consult to Case Management; Standing    Consult to Case Management; Standing    ticagrelor (Brilinta) 90 MG; Take 1 tablet (90 mg total) by mouth every 12 (twelve) hours

## 2025-05-13 NOTE — DISCHARGE SUMMARY
Discharge Summary - Cardiology   Name: Sesar Reyes y.o. male I MRN: [id number]  Unit/Bed#: [facility]-316-01 I Date of Admission: 5/11/2025   Date of Service: 5/13/2025 I Hospital Day: 2                 Cardiology Discharge Note.   Unit/Bed#: [facility]-316-01 Encounter: [id number]      Sesar Earl 64 y.o. male [id number]  Hospital Stay Days: 2    Assessment and Plan      Current Problem List   Principal Problem:    STEMI (ST elevation myocardial infarction) ([facility])  Active Problems:    Hyperlipidemia    Smoking hx    Pre-diabetes        Assessment/Plan:    Assessment & Plan  STEMI (ST elevation myocardial infarction) ([facility])  Patient with chest pain, EKG suggestive of inferior STEMI  Status post left heart cath, MUKESH to RCA  Telemetry shows sinus bradycardia with ventricular escape rhythm intermittently  Currently chest pain-free  Risk factors: Smoker, prediabetes, hyperlipidemia  TTE: EF 55%.       Orders from past 72 hours:    Consult to Cardiology; Standing    Inpatient consult to Case Management; Standing    Consult to Case Management; Standing    ticagrelor (Brilinta) 90 MG; Take 1 tablet (90 mg total) by mouth every 12 (twelve) hours    Hyperlipidemia  Lipid panel from this admission showing , triglycerides 123, HDL 49,     No associated orders from this encounter found during lookback period of 72 hours.  Smoking hx  daily smoker.    No associated orders from this encounter found during lookback period of 72 hours.  Pre-diabetes  HBA1C 5.8    No associated orders from this encounter found during lookback period of 72 hours.      Plan:    Pt is doing well. Has been ambulating without any chest pain or SOB.   Tele still showed persistent sinus bradycardia into high 40s and 50s and AIVR. Will hold beta blockers on discharge.   Will order 2 weeks Zio.   C/w ASA and Plavix. Pt has no insurance so he was switched from Brilinta to Plavix.   C/w Lipitor.   I will get him 7 day STEMI follow up.   Refer to cardiac  rehab.   Plan to discharge today.           Subjective     Pt was seen and examined. No major 24 hours events.    Objective     Vitals: Temp (24hrs), Av.1 °F (36.7 °C), Min:97.7 °F (36.5 °C), Max:98.5 °F (36.9 °C)  Current: Temperature: 97.9 °F (36.6 °C)  Patient Vitals for the past 24 hrs:   BP Temp Pulse Resp SpO2 Weight   25 1106 107/67 97.9 °F (36.6 °C) 56 17 99 % --   25 0713 114/68 98.2 °F (36.8 °C) (!) 54 16 98 % --   25 0617 -- -- -- -- -- 62.4 kg (137 lb 9.1 oz)   25 0235 122/72 97.7 °F (36.5 °C) 57 16 98 % --   25 2248 109/64 98.2 °F (36.8 °C) (!) 54 16 97 % --   25 1901 116/74 98.5 °F (36.9 °C) 62 16 99 % --   25 1457 106/64 98.2 °F (36.8 °C) 57 16 97 % --    Body mass index is 20.02 kg/m².  Physical Exam:  Physical Exam  Vitals reviewed.   Constitutional:       General: He is not in acute distress.     Appearance: He is well-developed. He is not diaphoretic.   Cardiovascular:      Rate and Rhythm: Regular rhythm. Bradycardia present.      Heart sounds: Normal heart sounds. No murmur heard.     No friction rub.   Pulmonary:      Effort: Pulmonary effort is normal. No respiratory distress.      Breath sounds: Normal breath sounds. No stridor. No wheezing.   Psychiatric:         Mood and Affect: Mood normal.         Thought Content: Thought content normal.         Judgment: Judgment normal.         Invasive Devices       Peripheral Intravenous Line  Duration             Peripheral IV 25 Right Antecubital 2 days                        Labs:   Results from last 7 days   Lab Units 25  0502 25  0306   WBC Thousand/uL  --  11.29*   HEMOGLOBIN g/dL  --  14.5   HEMATOCRIT %  --  44.9   PLATELETS Thousands/uL 175 226   SEGS PCT %  --  46   MONO PCT %  --  7   EOS PCT %  --  7*      Results from last 7 days   Lab Units 25  0910 25  0306   SODIUM mmol/L 137 143   POTASSIUM mmol/L 4.3 3.8   CHLORIDE mmol/L 103 102   CO2 mmol/L 26 30   BUN  "mg/dL 16 16   CREATININE mg/dL 0.89 0.92   CALCIUM mg/dL 9.7 9.5   MAGNESIUM mg/dL  --  2.1   INR   --  0.83*   PTT seconds  --  24   EGFR ml/min/1.73sq m 91 88     Results from last 7 days   Lab Units 05/11/25  0306   INR  0.83*   PTT seconds 24             No results found for: \"PHART\", \"EHZ0TRW\", \"PO2ART\", \"UMV4DGH\", \"X5WHCVTV\", \"BEART\", \"SOURCE\"  No components found for: \"HIV1X2\"  No results found for: \"HAV\", \"HEPAIGM\", \"HEPBIGM\", \"HEPBCAB\", \"HBEAG\", \"HEPCAB\"  No results found for: \"SPEP\", \"UPEP\"   Lab Results   Component Value Date    HGBA1C 5.8 (H) 05/11/2025     No results found for: \"CHOL\"   Lab Results   Component Value Date    HDL 49 05/11/2025      Lab Results   Component Value Date    LDLCALC 128 (H) 05/11/2025      Lab Results   Component Value Date    TRIG 123 05/11/2025     No components found for: \"PROCAL\"      Micro:      Urinalysis:  No results found for: \"AMPHETUR\", \"BDZUR\", \"COCAINEUR\", \"OPIATEUR\", \"PCPUR\", \"THCUR\", \"ETOH\", \"ACTMNPHEN\", \"SALICYLATE\"       Invalid input(s): \"URIBILINOGEN\"        Intake and Outputs:  I/O         05/11 0701 05/12 0700 05/12 0701 05/13 0700 05/13 0701 05/14 0700    P.O. 522 100 0    Total Intake(mL/kg) 522 (8) 100 (1.6) 0 (0)    Urine (mL/kg/hr) 0 (0)      Total Output 0      Net +522 +100 0           Unmeasured Urine Occurrence 5 x 7 x           Nutrition:  Diet Cardiovascular; Cardiac  Radiology Results:   XR chest 1 view portable   ED Interpretation by Uvaldo Wahl DO (05/11 0317)   No infiltrate, no pneumothorax, no effusion.  No widened mediastinum.      Final Result by Greg Redd MD (05/11 4907)      No acute cardiopulmonary disease.            Workstation performed: [id number]           Scheduled Medications:  aspirin, 81 mg, Daily  atorvastatin, 80 mg, QPM  chlorhexidine, 15 mL, Q12H NEELIMA  [START ON 5/14/2025] clopidogrel, 75 mg, Daily  enoxaparin, 40 mg, Daily  nicotine, 21 mg, Daily      PRN MEDS:     Last 24 Hour Meds: :   Medication " Administration - last 24 hours from 05/12/2025 1238 to 05/13/2025 1238         Date/Time Order Dose Route Action Action by     05/12/2025 1736 EDT atorvastatin (LIPITOR) tablet 80 mg 80 mg Oral Given Trang Boyle RN     05/13/2025 0940 EDT aspirin chewable tablet 81 mg 81 mg Oral Given Trang Boyle RN     05/13/2025 0939 EDT enoxaparin (LOVENOX) subcutaneous injection 40 mg 40 mg Subcutaneous Given Trang Boyle RN     05/12/2025 2013 EDT ticagrelor (BRILINTA) tablet 90 mg 90 mg Oral Given Rosaline Huston RN     05/13/2025 0939 EDT chlorhexidine (PERIDEX) 0.12 % oral rinse 15 mL 15 mL Mouth/Throat Given Trang Boyle RN     05/12/2025 2014 EDT chlorhexidine (PERIDEX) 0.12 % oral rinse 15 mL 15 mL Mouth/Throat Given Rosaline Huston RN     05/13/2025 0814 EDT nicotine (NICODERM CQ) 21 mg/24 hr TD 24 hr patch 21 mg 21 mg Transdermal Not Given Trang Boyle RN     05/13/2025 0939 EDT clopidogrel (PLAVIX) tablet 300 mg 300 mg Oral Given Trang Boyle RN            PLEASE NOTE:  This encounter was completed utilizing the [employer] Direct Speech Voice Recognition Software. Grammatical errors, random word insertions, pronoun errors and incomplete sentences are occasional consequences of the system due to software limitations, ambient noise and hardware issues.These may be missed by proof reading prior to affixing electronic signature. Any questions or concerns about the content, text or information contained within the body of this dictation should be directly addressed to the physician for clarification. Please do not hesitate to call me directly if you have any any questions or concerns.

## 2025-05-13 NOTE — ASSESSMENT & PLAN NOTE
daily smoker.    No associated orders from this encounter found during lookback period of 72 hours.

## 2025-05-13 NOTE — ASSESSMENT & PLAN NOTE
Lipid panel from this admission showing , triglycerides 123, HDL 49,     No associated orders from this encounter found during lookback period of 72 hours.

## 2025-05-14 ENCOUNTER — TELEPHONE (OUTPATIENT)
Dept: CARDIOLOGY CLINIC | Facility: CLINIC | Age: 65
End: 2025-05-14

## 2025-05-14 NOTE — TELEPHONE ENCOUNTER
----- Message from Mihir Magana DO sent at 5/13/2025 10:01 AM EDT -----  Regarding: STEMI follow up  Hi. Please call him for 7 day STEMI follow up. Thanks.

## 2025-07-31 PROBLEM — I25.118 CORONARY ARTERY DISEASE OF NATIVE ARTERY OF NATIVE HEART WITH STABLE ANGINA PECTORIS (HCC): Status: ACTIVE | Noted: 2025-07-31

## 2025-08-21 ENCOUNTER — OFFICE VISIT (OUTPATIENT)
Dept: CARDIOLOGY CLINIC | Facility: CLINIC | Age: 65
End: 2025-08-21

## 2025-08-21 VITALS
BODY MASS INDEX: 20.76 KG/M2 | SYSTOLIC BLOOD PRESSURE: 128 MMHG | HEIGHT: 70 IN | HEART RATE: 70 BPM | WEIGHT: 145 LBS | DIASTOLIC BLOOD PRESSURE: 78 MMHG

## 2025-08-21 DIAGNOSIS — I25.118 CORONARY ARTERY DISEASE OF NATIVE ARTERY OF NATIVE HEART WITH STABLE ANGINA PECTORIS (HCC): ICD-10-CM

## 2025-08-21 DIAGNOSIS — I21.3 STEMI (ST ELEVATION MYOCARDIAL INFARCTION) (HCC): ICD-10-CM

## 2025-08-21 DIAGNOSIS — I21.3 ST ELEVATION MYOCARDIAL INFARCTION (STEMI), UNSPECIFIED ARTERY (HCC): Primary | ICD-10-CM

## 2025-08-21 DIAGNOSIS — E78.2 MIXED HYPERLIPIDEMIA: ICD-10-CM

## 2025-08-21 PROCEDURE — 99214 OFFICE O/P EST MOD 30 MIN: CPT

## 2025-08-21 PROCEDURE — 93000 ELECTROCARDIOGRAM COMPLETE: CPT

## 2025-08-21 RX ORDER — CLOPIDOGREL 300 MG/1
600 TABLET, FILM COATED ORAL ONCE
Qty: 2 TABLET | Refills: 0 | Status: SHIPPED | OUTPATIENT
Start: 2025-08-21 | End: 2025-08-21

## 2025-08-21 RX ORDER — NICOTINE 21 MG/24HR
1 PATCH, TRANSDERMAL 24 HOURS TRANSDERMAL DAILY
Qty: 30 PATCH | Refills: 2 | Status: SHIPPED | OUTPATIENT
Start: 2025-08-21

## 2025-08-21 RX ORDER — CLOPIDOGREL BISULFATE 75 MG/1
75 TABLET ORAL DAILY
Qty: 30 TABLET | Refills: 11 | Status: SHIPPED | OUTPATIENT
Start: 2025-08-22

## 2025-08-21 RX ORDER — ATORVASTATIN CALCIUM 80 MG/1
80 TABLET, FILM COATED ORAL EVERY EVENING
Qty: 30 TABLET | Refills: 11 | Status: SHIPPED | OUTPATIENT
Start: 2025-08-21

## (undated) DEVICE — CATH GUIDE LAUNCHER 6FR JR 4.0

## (undated) DEVICE — GUIDEWIRE .035 260CM 3MM J TIP

## (undated) DEVICE — RADIFOCUS OPTITORQUE ANGIOGRAPHIC CATHETER: Brand: OPTITORQUE

## (undated) DEVICE — BALLOON EUPHORA RX 2.5 X 15MM

## (undated) DEVICE — BALLOON NC EUPHORA 4 X 15MM

## (undated) DEVICE — RUNTHROUGH NS EXTRA FLOPPY PTCA GUIDEWIRE: Brand: RUNTHROUGH

## (undated) DEVICE — TR BAND RADIAL ARTERY COMPRESSION DEVICE: Brand: TR BAND

## (undated) DEVICE — GUIDELINER CATHETERS ARE INTENDED TO BE USED IN CONJUNCTION WITH GUIDE CATHETERS TO ACCESS DISCRETE REGIONS OF THE CORONARY AND/OR PERIPHERAL VASCULATURE, AND TO FACILITATE PLACEMENT OF INTERVENTIONAL DEVICES.: Brand: GUIDELINER® V3 CATHETER

## (undated) DEVICE — CORONARY IMAGING CATHETER: Brand: OPTICROSS™ 6 HD

## (undated) DEVICE — GUIDEWIRE WHOLEY .035 145 CM FLOP STR TIP